# Patient Record
Sex: FEMALE | URBAN - METROPOLITAN AREA
[De-identification: names, ages, dates, MRNs, and addresses within clinical notes are randomized per-mention and may not be internally consistent; named-entity substitution may affect disease eponyms.]

---

## 2024-07-12 ENCOUNTER — HOSPITAL ENCOUNTER (INPATIENT)
Age: 79
LOS: 5 days | Discharge: NURSING, REHAB OR LONG-TERM CARE | End: 2024-07-17
Attending: INTERNAL MEDICINE
Payer: MEDICARE

## 2024-07-12 DIAGNOSIS — N13.1 HYDRONEPHROSIS WITH URETERAL STRICTURE, NOT ELSEWHERE CLASSIFIED: Primary | ICD-10-CM

## 2024-07-12 PROBLEM — A41.9 SEPSIS (HCC): Status: ACTIVE | Noted: 2024-07-12

## 2024-07-12 LAB
ALBUMIN SERPL-MCNC: 2.9 G/DL (ref 3.5–5.2)
ALBUMIN/GLOB SERPL: 1 {RATIO} (ref 1–2.5)
ALP SERPL-CCNC: 107 U/L (ref 35–104)
ALT SERPL-CCNC: <5 U/L (ref 10–35)
ANION GAP SERPL CALCULATED.3IONS-SCNC: 12 MMOL/L (ref 9–16)
AST SERPL-CCNC: 14 U/L (ref 10–35)
BASOPHILS # BLD: 0 K/UL (ref 0–0.2)
BASOPHILS NFR BLD: 0 % (ref 0–2)
BILIRUB SERPL-MCNC: <0.2 MG/DL (ref 0–1.2)
BUN SERPL-MCNC: 28 MG/DL (ref 8–23)
CALCIUM SERPL-MCNC: 8.2 MG/DL (ref 8.6–10.4)
CHLORIDE SERPL-SCNC: 113 MMOL/L (ref 98–107)
CO2 SERPL-SCNC: 10 MMOL/L (ref 20–31)
CREAT SERPL-MCNC: 1.6 MG/DL (ref 0.5–0.9)
EOSINOPHIL # BLD: 0 K/UL (ref 0–0.4)
EOSINOPHILS RELATIVE PERCENT: 0 % (ref 1–4)
ERYTHROCYTE [DISTWIDTH] IN BLOOD BY AUTOMATED COUNT: 15.3 % (ref 11.8–14.4)
GFR, ESTIMATED: 32 ML/MIN/1.73M2
GLUCOSE BLD-MCNC: 122 MG/DL (ref 65–105)
GLUCOSE SERPL-MCNC: 166 MG/DL (ref 74–99)
HCT VFR BLD AUTO: 36.9 % (ref 36.3–47.1)
HGB BLD-MCNC: 11 G/DL (ref 11.9–15.1)
IMM GRANULOCYTES # BLD AUTO: 0.29 K/UL (ref 0–0.3)
IMM GRANULOCYTES NFR BLD: 2 %
LYMPHOCYTES NFR BLD: 0.72 K/UL (ref 1–4.8)
LYMPHOCYTES RELATIVE PERCENT: 5 % (ref 24–44)
MCH RBC QN AUTO: 29.2 PG (ref 25.2–33.5)
MCHC RBC AUTO-ENTMCNC: 29.8 G/DL (ref 28.4–34.8)
MCV RBC AUTO: 97.9 FL (ref 82.6–102.9)
MONOCYTES NFR BLD: 0.29 K/UL (ref 0.1–0.8)
MONOCYTES NFR BLD: 2 % (ref 1–7)
MORPHOLOGY: ABNORMAL
NEUTROPHILS NFR BLD: 91 % (ref 36–66)
NEUTS SEG NFR BLD: 13.1 K/UL (ref 1.8–7.7)
NRBC BLD-RTO: 0 PER 100 WBC
PLATELET # BLD AUTO: 349 K/UL (ref 138–453)
PMV BLD AUTO: 8.8 FL (ref 8.1–13.5)
POTASSIUM SERPL-SCNC: 4.4 MMOL/L (ref 3.7–5.3)
PROT SERPL-MCNC: 6.2 G/DL (ref 6.6–8.7)
RBC # BLD AUTO: 3.77 M/UL (ref 3.95–5.11)
SODIUM SERPL-SCNC: 135 MMOL/L (ref 136–145)
WBC OTHER # BLD: 14.4 K/UL (ref 3.5–11.3)

## 2024-07-12 PROCEDURE — 80053 COMPREHEN METABOLIC PANEL: CPT

## 2024-07-12 PROCEDURE — 6360000002 HC RX W HCPCS

## 2024-07-12 PROCEDURE — 2000000000 HC ICU R&B

## 2024-07-12 PROCEDURE — 6370000000 HC RX 637 (ALT 250 FOR IP)

## 2024-07-12 PROCEDURE — 2500000003 HC RX 250 WO HCPCS

## 2024-07-12 PROCEDURE — 2580000003 HC RX 258

## 2024-07-12 PROCEDURE — 36415 COLL VENOUS BLD VENIPUNCTURE: CPT

## 2024-07-12 PROCEDURE — 85025 COMPLETE CBC W/AUTO DIFF WBC: CPT

## 2024-07-12 PROCEDURE — 87040 BLOOD CULTURE FOR BACTERIA: CPT

## 2024-07-12 PROCEDURE — 82947 ASSAY GLUCOSE BLOOD QUANT: CPT

## 2024-07-12 PROCEDURE — 87086 URINE CULTURE/COLONY COUNT: CPT

## 2024-07-12 PROCEDURE — 99223 1ST HOSP IP/OBS HIGH 75: CPT | Performed by: INTERNAL MEDICINE

## 2024-07-12 PROCEDURE — 87641 MR-STAPH DNA AMP PROBE: CPT

## 2024-07-12 RX ORDER — ONDANSETRON 4 MG/1
4 TABLET, ORALLY DISINTEGRATING ORAL EVERY 8 HOURS PRN
Status: DISCONTINUED | OUTPATIENT
Start: 2024-07-12 | End: 2024-07-17 | Stop reason: HOSPADM

## 2024-07-12 RX ORDER — PHENAZOPYRIDINE HYDROCHLORIDE 100 MG/1
200 TABLET, FILM COATED ORAL 3 TIMES DAILY PRN
Status: DISCONTINUED | OUTPATIENT
Start: 2024-07-12 | End: 2024-07-14

## 2024-07-12 RX ORDER — MAGNESIUM SULFATE IN WATER 40 MG/ML
2000 INJECTION, SOLUTION INTRAVENOUS PRN
Status: DISCONTINUED | OUTPATIENT
Start: 2024-07-12 | End: 2024-07-17 | Stop reason: HOSPADM

## 2024-07-12 RX ORDER — NOREPINEPHRINE BITARTRATE 0.06 MG/ML
1-100 INJECTION, SOLUTION INTRAVENOUS CONTINUOUS
Status: DISCONTINUED | OUTPATIENT
Start: 2024-07-12 | End: 2024-07-15

## 2024-07-12 RX ORDER — CARVEDILOL 25 MG/1
25 TABLET ORAL 2 TIMES DAILY WITH MEALS
Status: DISCONTINUED | OUTPATIENT
Start: 2024-07-12 | End: 2024-07-17 | Stop reason: HOSPADM

## 2024-07-12 RX ORDER — ISOSORBIDE MONONITRATE 30 MG/1
30 TABLET, EXTENDED RELEASE ORAL DAILY
Status: DISCONTINUED | OUTPATIENT
Start: 2024-07-12 | End: 2024-07-17 | Stop reason: HOSPADM

## 2024-07-12 RX ORDER — SODIUM CHLORIDE 9 MG/ML
INJECTION, SOLUTION INTRAVENOUS PRN
Status: DISCONTINUED | OUTPATIENT
Start: 2024-07-12 | End: 2024-07-17 | Stop reason: HOSPADM

## 2024-07-12 RX ORDER — ATORVASTATIN CALCIUM 40 MG/1
40 TABLET, FILM COATED ORAL NIGHTLY
Status: DISCONTINUED | OUTPATIENT
Start: 2024-07-12 | End: 2024-07-17 | Stop reason: HOSPADM

## 2024-07-12 RX ORDER — SODIUM CHLORIDE 0.9 % (FLUSH) 0.9 %
5-40 SYRINGE (ML) INJECTION EVERY 12 HOURS SCHEDULED
Status: DISCONTINUED | OUTPATIENT
Start: 2024-07-12 | End: 2024-07-17 | Stop reason: HOSPADM

## 2024-07-12 RX ORDER — PHENAZOPYRIDINE HYDROCHLORIDE 100 MG/1
200 TABLET, FILM COATED ORAL
Status: DISCONTINUED | OUTPATIENT
Start: 2024-07-13 | End: 2024-07-12

## 2024-07-12 RX ORDER — ACETAMINOPHEN 650 MG/1
650 SUPPOSITORY RECTAL EVERY 6 HOURS PRN
Status: DISCONTINUED | OUTPATIENT
Start: 2024-07-12 | End: 2024-07-17 | Stop reason: HOSPADM

## 2024-07-12 RX ORDER — SODIUM CHLORIDE 0.9 % (FLUSH) 0.9 %
5-40 SYRINGE (ML) INJECTION PRN
Status: DISCONTINUED | OUTPATIENT
Start: 2024-07-12 | End: 2024-07-17 | Stop reason: HOSPADM

## 2024-07-12 RX ORDER — MIDODRINE HYDROCHLORIDE 5 MG/1
2.5 TABLET ORAL
Status: DISCONTINUED | OUTPATIENT
Start: 2024-07-12 | End: 2024-07-13

## 2024-07-12 RX ORDER — DULOXETIN HYDROCHLORIDE 20 MG/1
20 CAPSULE, DELAYED RELEASE ORAL NIGHTLY
Status: DISCONTINUED | OUTPATIENT
Start: 2024-07-12 | End: 2024-07-17 | Stop reason: HOSPADM

## 2024-07-12 RX ORDER — POTASSIUM CHLORIDE 29.8 MG/ML
20 INJECTION INTRAVENOUS PRN
Status: DISCONTINUED | OUTPATIENT
Start: 2024-07-12 | End: 2024-07-17 | Stop reason: HOSPADM

## 2024-07-12 RX ORDER — ACETAMINOPHEN 325 MG/1
650 TABLET ORAL EVERY 6 HOURS PRN
Status: DISCONTINUED | OUTPATIENT
Start: 2024-07-12 | End: 2024-07-17 | Stop reason: HOSPADM

## 2024-07-12 RX ORDER — IPRATROPIUM BROMIDE AND ALBUTEROL SULFATE 2.5; .5 MG/3ML; MG/3ML
1 SOLUTION RESPIRATORY (INHALATION) EVERY 4 HOURS PRN
Status: DISCONTINUED | OUTPATIENT
Start: 2024-07-12 | End: 2024-07-17 | Stop reason: HOSPADM

## 2024-07-12 RX ORDER — DEXMEDETOMIDINE HYDROCHLORIDE 4 UG/ML
.1-1.5 INJECTION, SOLUTION INTRAVENOUS CONTINUOUS
Status: DISCONTINUED | OUTPATIENT
Start: 2024-07-12 | End: 2024-07-15

## 2024-07-12 RX ORDER — POTASSIUM CHLORIDE 7.45 MG/ML
10 INJECTION INTRAVENOUS PRN
Status: DISCONTINUED | OUTPATIENT
Start: 2024-07-12 | End: 2024-07-17 | Stop reason: HOSPADM

## 2024-07-12 RX ORDER — POLYETHYLENE GLYCOL 3350 17 G/17G
17 POWDER, FOR SOLUTION ORAL DAILY PRN
Status: DISCONTINUED | OUTPATIENT
Start: 2024-07-12 | End: 2024-07-17 | Stop reason: HOSPADM

## 2024-07-12 RX ORDER — ONDANSETRON 2 MG/ML
4 INJECTION INTRAMUSCULAR; INTRAVENOUS EVERY 6 HOURS PRN
Status: DISCONTINUED | OUTPATIENT
Start: 2024-07-12 | End: 2024-07-17 | Stop reason: HOSPADM

## 2024-07-12 RX ORDER — OXYCODONE HYDROCHLORIDE 5 MG/1
5 TABLET ORAL ONCE
Status: COMPLETED | OUTPATIENT
Start: 2024-07-13 | End: 2024-07-12

## 2024-07-12 RX ORDER — HEPARIN SODIUM 5000 [USP'U]/ML
5000 INJECTION, SOLUTION INTRAVENOUS; SUBCUTANEOUS EVERY 8 HOURS SCHEDULED
Status: DISCONTINUED | OUTPATIENT
Start: 2024-07-12 | End: 2024-07-17 | Stop reason: HOSPADM

## 2024-07-12 RX ADMIN — DULOXETINE HYDROCHLORIDE 20 MG: 20 CAPSULE, DELAYED RELEASE ORAL at 20:26

## 2024-07-12 RX ADMIN — PANTOPRAZOLE SODIUM 40 MG: 40 INJECTION, POWDER, FOR SOLUTION INTRAVENOUS at 17:52

## 2024-07-12 RX ADMIN — HYOSCYAMINE SULFATE 125 MCG: 0.12 TABLET SUBLINGUAL at 23:51

## 2024-07-12 RX ADMIN — SODIUM CHLORIDE, PRESERVATIVE FREE 10 ML: 5 INJECTION INTRAVENOUS at 19:34

## 2024-07-12 RX ADMIN — HEPARIN SODIUM 5000 UNITS: 5000 INJECTION INTRAVENOUS; SUBCUTANEOUS at 21:15

## 2024-07-12 RX ADMIN — MIDODRINE HYDROCHLORIDE 2.5 MG: 5 TABLET ORAL at 17:53

## 2024-07-12 RX ADMIN — ACETAMINOPHEN 650 MG: 325 TABLET ORAL at 20:30

## 2024-07-12 RX ADMIN — OXYCODONE 5 MG: 5 TABLET ORAL at 23:48

## 2024-07-12 RX ADMIN — SODIUM CHLORIDE: 9 INJECTION, SOLUTION INTRAVENOUS at 17:56

## 2024-07-12 RX ADMIN — DESMOPRESSIN ACETATE 40 MG: 0.2 TABLET ORAL at 20:25

## 2024-07-12 RX ADMIN — Medication 8 MCG/MIN: at 16:14

## 2024-07-12 RX ADMIN — MEROPENEM 1000 MG: 1 INJECTION INTRAVENOUS at 20:25

## 2024-07-12 RX ADMIN — Medication 1000 MG: at 17:52

## 2024-07-12 RX ADMIN — DEXMEDETOMIDINE HYDROCHLORIDE 0.2 MCG/KG/HR: 4 INJECTION, SOLUTION INTRAVENOUS at 19:32

## 2024-07-12 ASSESSMENT — PAIN DESCRIPTION - LOCATION
LOCATION: VULVA
LOCATION: VULVA
LOCATION: FOOT
LOCATION: VULVA

## 2024-07-12 ASSESSMENT — PAIN DESCRIPTION - DESCRIPTORS
DESCRIPTORS: BURNING;CRAMPING
DESCRIPTORS: BURNING
DESCRIPTORS: BURNING

## 2024-07-12 ASSESSMENT — PAIN SCALES - WONG BAKER
WONGBAKER_NUMERICALRESPONSE: NO HURT

## 2024-07-12 ASSESSMENT — PAIN SCALES - GENERAL
PAINLEVEL_OUTOF10: 10
PAINLEVEL_OUTOF10: 0
PAINLEVEL_OUTOF10: 10

## 2024-07-12 NOTE — H&P
Critical Care - History and Physical Examination    Patient's name:  Suzy Flaherty  Medical Record Number: [id number]  Patient's account/billing number: [id number]  Patient's YOB: 1945  Age: 78 y.o.  Date of Admission: 7/12/2024  4:04 PM  Reason of ICU admission:   Date of History and Physical Examination: 7/12/2024      Primary Care Physician: No primary care provider on file.  Attending Physician:    Code Status: Full Code     Chief complaint: AMS     Reason for ICU admission:  Septic Shock       History Of Present Illness:   History was obtained from the patient.      Suzy Flaherty is a 78 y.o. with Pmhx of   Prior Urethral Stricture S/P Stent Exchange 7/11/2024, CKD Stage IV, prensented to the ED for Hyperkalemia of 6.5 with EKG Changes , new onset Hydronephrosis, Hypotension. Patient in ED had UA showing evidence of Elevated Leucocyte Esterase. Patient initially BP was controlled with 2L LR Bolus but than became Hypotensive with recorded Systolic BP of  70's. Started and Progressed to Levophed at 8 mcg/min. Patient at her baseline mentation is A&O x 2 but today was somnolent and appeared confused but not agitated . Patient was transferred to ICU due to AMS and Hypotensive episodes requiring Vasopressors.           Past Medical History:  No past medical history on file.    Past Surgical History:  No past surgical history on file.    Allergies:    Allergies   Allergen Reactions    Fentanyl Hallucinations     Pt sensitive to medication - hallucinates         Home Medications:   Prior to Admission medications    Not on File       Social History:   TOBACCO:   has no history on file for tobacco use.  ETOH:   has no history on file for alcohol use.  DRUGS:  has no history on file for drug use.  OCCUPATION:      Family History:   No family history on file.        REVIEW OF SYSTEMS (ROS):  Review of Systems - Negative except mentioned in HPI   General ROS: negative  Psychological ROS:  negative  Ophthalmic ROS: negative  ENT: negative  Hematological and Lymphatic ROS: negative  Endocrine ROS: negative  Breast ROS: negative  Respiratory ROS: no cough, shortness of breath, or wheezing  Cardiovascular ROS: no chest pain or dyspnea on exertion  Gastrointestinal ROS:negative  Genito-Urinary ROS: negative  Musculoskeletal ROS: negative  Neurological ROS: negative  Dermatological ROS: negative      Physical Exam:    Vitals: Pulse 97   Temp 99 °F (37.2 °C) (Axillary)   Resp 18     Body weight:   Wt Readings from Last 3 Encounters:   No data found for Wt       Body Mass Index : There is no height or weight on file to calculate BMI.          PHYSICAL EXAMINATION :  Constitutional: Patient appears anxious, A&O x 1   EENT: PERRLA, EOMI, sclera clear, anicteric, oropharynx clear, no lesions, neck supple with midline trachea. Dry Mucus Membrane   Neck: Supple, symmetrical, trachea midline, no adenopathy, thyroid symmetric, no jvd skin normal  Respiratory: clear to auscultation, no wheezes or rales and unlabored breathing. No intercostal tenderness  Cardiovascular: regular rate and rhythm, normal S1, S2, no murmur noted and 2+ pulses throughout  Abdomen: soft, nontender, nondistended, no masses or organomegaly  Neurological:  Extremities:  peripheral pulses normal, no pedal edema, no clubbing or cyanosis      Laboratory findings:-    CBC: No results for input(s): \"WBC\", \"HGB\", \"PLT\" in the last 72 hours.  BMP:  No results for input(s): \"NA\", \"K\", \"CL\", \"CO2\", \"BUN\", \"CREATININE\", \"GLUCOSE\" in the last 72 hours.  S. Calcium:No results for input(s): \"CALCIUM\" in the last 72 hours.  S. Ionized Calcium:Invalid input(s): \"IONCA\"  S. Magnesium:No results for input(s): \"MG\" in the last 72 hours.  S. Phosphorus:No results for input(s): \"PHOS\" in the last 72 hours.  S. Glucose:No results for input(s): \"POCGLU\" in the last 72 hours.  Glycosylated hemoglobin A1C: No results for input(s): \"LABA1C\" in the last 72

## 2024-07-12 NOTE — PLAN OF CARE
Problem: Discharge Planning  Goal: Discharge to home or other facility with appropriate resources  Outcome: Progressing     Problem: Pain  Goal: Verbalizes/displays adequate comfort level or baseline comfort level  Outcome: Progressing     Problem: Safety - Adult  Goal: Free from fall injury  Outcome: Progressing     Problem: Skin/Tissue Integrity  Goal: Absence of new skin breakdown  Description: 1.  Monitor for areas of redness and/or skin breakdown  2.  Assess vascular access sites hourly  3.  Every 4-6 hours minimum:  Change oxygen saturation probe site  4.  Every 4-6 hours:  If on nasal continuous positive airway pressure, respiratory therapy assess nares and determine need for appliance change or resting period.  Outcome: Progressing     Problem: ABCDS Injury Assessment  Goal: Absence of physical injury  Outcome: Progressing     Problem: Confusion  Goal: Confusion, delirium, dementia, or psychosis is improved or at baseline  Description: INTERVENTIONS:  1. Assess for possible contributors to thought disturbance, including medications, impaired vision or hearing, underlying metabolic abnormalities, dehydration, psychiatric diagnoses, and notify attending LIP  2. [place] high risk fall precautions, as indicated  3. Provide frequent short contacts to provide reality reorientation, refocusing and direction  4. Decrease environmental stimuli, including noise as appropriate  5. Monitor and intervene to maintain adequate nutrition, hydration, elimination, sleep and activity  6. If unable to ensure safety without constant attention obtain sitter and review sitter guidelines with assigned personnel  7. Initiate Psychosocial CNS and Spiritual Care consult, as indicated  Outcome: Progressing     Problem: Respiratory - Adult  Goal: Achieves optimal ventilation and oxygenation  Outcome: Progressing     Problem: Cardiovascular - Adult  Goal: Maintains optimal cardiac output and hemodynamic stability  Outcome:  Progressing  Goal: Absence of cardiac dysrhythmias or at baseline  Outcome: Progressing     Problem: Skin/Tissue Integrity - Adult  Goal: Skin integrity remains intact  Outcome: Progressing  Goal: Incisions, wounds, or drain sites healing without S/S of infection  Outcome: Progressing  Goal: Oral mucous membranes remain intact  Outcome: Progressing

## 2024-07-13 PROBLEM — N39.0 URINARY TRACT INFECTION WITHOUT HEMATURIA: Status: ACTIVE | Noted: 2024-07-13

## 2024-07-13 LAB
ANION GAP SERPL CALCULATED.3IONS-SCNC: 12 MMOL/L (ref 9–16)
BASOPHILS # BLD: 0.04 K/UL (ref 0–0.2)
BASOPHILS NFR BLD: 0 % (ref 0–2)
BUN SERPL-MCNC: 27 MG/DL (ref 8–23)
CALCIUM SERPL-MCNC: 8.5 MG/DL (ref 8.6–10.4)
CHLORIDE SERPL-SCNC: 115 MMOL/L (ref 98–107)
CO2 SERPL-SCNC: 11 MMOL/L (ref 20–31)
CREAT SERPL-MCNC: 1.6 MG/DL (ref 0.5–0.9)
EOSINOPHIL # BLD: <0.03 K/UL (ref 0–0.44)
EOSINOPHILS RELATIVE PERCENT: 0 % (ref 1–4)
ERYTHROCYTE [DISTWIDTH] IN BLOOD BY AUTOMATED COUNT: 15.2 % (ref 11.8–14.4)
GFR, ESTIMATED: 32 ML/MIN/1.73M2
GLUCOSE SERPL-MCNC: 130 MG/DL (ref 74–99)
HCT VFR BLD AUTO: 35.4 % (ref 36.3–47.1)
HGB BLD-MCNC: 10.7 G/DL (ref 11.9–15.1)
IMM GRANULOCYTES # BLD AUTO: 0.2 K/UL (ref 0–0.3)
IMM GRANULOCYTES NFR BLD: 2 %
LYMPHOCYTES NFR BLD: 0.83 K/UL (ref 1.1–3.7)
LYMPHOCYTES RELATIVE PERCENT: 6 % (ref 24–43)
MCH RBC QN AUTO: 28.8 PG (ref 25.2–33.5)
MCHC RBC AUTO-ENTMCNC: 30.2 G/DL (ref 28.4–34.8)
MCV RBC AUTO: 95.4 FL (ref 82.6–102.9)
MICROORGANISM SPEC CULT: NORMAL
MONOCYTES NFR BLD: 0.76 K/UL (ref 0.1–1.2)
MONOCYTES NFR BLD: 6 % (ref 3–12)
MRSA, DNA, NASAL: ABNORMAL
NEUTROPHILS NFR BLD: 86 % (ref 36–65)
NEUTS SEG NFR BLD: 11.85 K/UL (ref 1.5–8.1)
NRBC BLD-RTO: 0 PER 100 WBC
PLATELET # BLD AUTO: 300 K/UL (ref 138–453)
PMV BLD AUTO: 8.7 FL (ref 8.1–13.5)
POTASSIUM SERPL-SCNC: 3.9 MMOL/L (ref 3.7–5.3)
RBC # BLD AUTO: 3.71 M/UL (ref 3.95–5.11)
RBC # BLD: ABNORMAL 10*6/UL
SERVICE CMNT-IMP: NORMAL
SODIUM SERPL-SCNC: 138 MMOL/L (ref 136–145)
SPECIMEN DESCRIPTION: ABNORMAL
SPECIMEN DESCRIPTION: NORMAL
WBC OTHER # BLD: 13.7 K/UL (ref 3.5–11.3)

## 2024-07-13 PROCEDURE — 6370000000 HC RX 637 (ALT 250 FOR IP)

## 2024-07-13 PROCEDURE — 6360000002 HC RX W HCPCS

## 2024-07-13 PROCEDURE — 85025 COMPLETE CBC W/AUTO DIFF WBC: CPT

## 2024-07-13 PROCEDURE — 99291 CRITICAL CARE FIRST HOUR: CPT | Performed by: INTERNAL MEDICINE

## 2024-07-13 PROCEDURE — 2000000000 HC ICU R&B

## 2024-07-13 PROCEDURE — 36415 COLL VENOUS BLD VENIPUNCTURE: CPT

## 2024-07-13 PROCEDURE — 99233 SBSQ HOSP IP/OBS HIGH 50: CPT | Performed by: INTERNAL MEDICINE

## 2024-07-13 PROCEDURE — 2580000003 HC RX 258

## 2024-07-13 PROCEDURE — 80048 BASIC METABOLIC PNL TOTAL CA: CPT

## 2024-07-13 PROCEDURE — 2500000003 HC RX 250 WO HCPCS

## 2024-07-13 RX ORDER — MIDODRINE HYDROCHLORIDE 5 MG/1
5 TABLET ORAL
Status: DISCONTINUED | OUTPATIENT
Start: 2024-07-13 | End: 2024-07-17 | Stop reason: HOSPADM

## 2024-07-13 RX ORDER — SODIUM CHLORIDE, SODIUM LACTATE, POTASSIUM CHLORIDE, CALCIUM CHLORIDE 600; 310; 30; 20 MG/100ML; MG/100ML; MG/100ML; MG/100ML
INJECTION, SOLUTION INTRAVENOUS CONTINUOUS
Status: DISCONTINUED | OUTPATIENT
Start: 2024-07-13 | End: 2024-07-17 | Stop reason: HOSPADM

## 2024-07-13 RX ORDER — MORPHINE SULFATE 4 MG/ML
4 INJECTION, SOLUTION INTRAMUSCULAR; INTRAVENOUS ONCE
Status: COMPLETED | OUTPATIENT
Start: 2024-07-13 | End: 2024-07-13

## 2024-07-13 RX ADMIN — DESMOPRESSIN ACETATE 40 MG: 0.2 TABLET ORAL at 19:56

## 2024-07-13 RX ADMIN — DEXMEDETOMIDINE HYDROCHLORIDE 0.4 MCG/KG/HR: 4 INJECTION, SOLUTION INTRAVENOUS at 11:18

## 2024-07-13 RX ADMIN — SODIUM CHLORIDE, PRESERVATIVE FREE 10 ML: 5 INJECTION INTRAVENOUS at 20:01

## 2024-07-13 RX ADMIN — HEPARIN SODIUM 5000 UNITS: 5000 INJECTION INTRAVENOUS; SUBCUTANEOUS at 14:30

## 2024-07-13 RX ADMIN — ACETAMINOPHEN 650 MG: 325 TABLET ORAL at 20:37

## 2024-07-13 RX ADMIN — SODIUM CHLORIDE, PRESERVATIVE FREE 10 ML: 5 INJECTION INTRAVENOUS at 10:32

## 2024-07-13 RX ADMIN — PANTOPRAZOLE SODIUM 40 MG: 40 INJECTION, POWDER, FOR SOLUTION INTRAVENOUS at 10:17

## 2024-07-13 RX ADMIN — PHENAZOPYRIDINE HYDROCHLORIDE 200 MG: 100 TABLET ORAL at 03:50

## 2024-07-13 RX ADMIN — MEROPENEM 1000 MG: 1 INJECTION INTRAVENOUS at 10:20

## 2024-07-13 RX ADMIN — HEPARIN SODIUM 5000 UNITS: 5000 INJECTION INTRAVENOUS; SUBCUTANEOUS at 05:09

## 2024-07-13 RX ADMIN — SODIUM CHLORIDE, POTASSIUM CHLORIDE, SODIUM LACTATE AND CALCIUM CHLORIDE: 600; 310; 30; 20 INJECTION, SOLUTION INTRAVENOUS at 15:06

## 2024-07-13 RX ADMIN — PHENAZOPYRIDINE HYDROCHLORIDE 200 MG: 100 TABLET ORAL at 19:56

## 2024-07-13 RX ADMIN — MIDODRINE HYDROCHLORIDE 5 MG: 5 TABLET ORAL at 17:17

## 2024-07-13 RX ADMIN — MEROPENEM 1000 MG: 1 INJECTION INTRAVENOUS at 20:00

## 2024-07-13 RX ADMIN — HYOSCYAMINE SULFATE 125 MCG: 0.12 TABLET SUBLINGUAL at 05:44

## 2024-07-13 RX ADMIN — DULOXETINE HYDROCHLORIDE 20 MG: 20 CAPSULE, DELAYED RELEASE ORAL at 19:56

## 2024-07-13 RX ADMIN — HYOSCYAMINE SULFATE 125 MCG: 0.12 TABLET SUBLINGUAL at 20:38

## 2024-07-13 RX ADMIN — MIDODRINE HYDROCHLORIDE 2.5 MG: 5 TABLET ORAL at 10:17

## 2024-07-13 RX ADMIN — HEPARIN SODIUM 5000 UNITS: 5000 INJECTION INTRAVENOUS; SUBCUTANEOUS at 21:00

## 2024-07-13 RX ADMIN — MORPHINE SULFATE 4 MG: 4 INJECTION INTRAVENOUS at 05:56

## 2024-07-13 ASSESSMENT — PAIN SCALES - WONG BAKER

## 2024-07-13 ASSESSMENT — PAIN DESCRIPTION - LOCATION
LOCATION: VULVA

## 2024-07-13 ASSESSMENT — PAIN SCALES - GENERAL
PAINLEVEL_OUTOF10: 10
PAINLEVEL_OUTOF10: 3
PAINLEVEL_OUTOF10: 0
PAINLEVEL_OUTOF10: 10
PAINLEVEL_OUTOF10: 0
PAINLEVEL_OUTOF10: 10
PAINLEVEL_OUTOF10: 0

## 2024-07-13 ASSESSMENT — PAIN DESCRIPTION - DESCRIPTORS
DESCRIPTORS: BURNING
DESCRIPTORS: BURNING
DESCRIPTORS: BURNING;CRAMPING
DESCRIPTORS: BURNING;CRAMPING

## 2024-07-13 NOTE — RT PROTOCOL NOTE
RT Nebulizer Bronchodilator Protocol Note    There is a bronchodilator order in the chart from a provider indicating to follow the RT Bronchodilator Protocol and there is an “Initiate RT Bronchodilator Protocol” order as well (see protocol at bottom of note).    CXR Findings:  No results found.    The findings from the last RT Protocol Assessment were as follows:  Smoking: None or smoker <15 pack years  Respiratory Pattern: Regular pattern and RR 12-20 bpm  Breath Sounds: Clear breath sounds  Cough: Strong, spontaneous, non-productive  Indication for Bronchodilator Therapy:    Bronchodilator Assessment Score: 0    Aerosolized bronchodilator medication orders have been revised according to the RT Nebulizer Bronchodilator Protocol below.    Respiratory Therapist to perform RT Therapy Protocol Assessment initially then follow the protocol.  Repeat RT Therapy Protocol Assessment PRN for score 0-3 or on second treatment, BID, and PRN for scores above 3.    No Indications - adjust the frequency to every 6 hours PRN wheezing or bronchospasm, if no treatments needed after 48 hours then discontinue using Per Protocol order mode.     If indication present, adjust the RT bronchodilator orders based on the Bronchodilator Assessment Score as indicated below.  If a patient is on this medication at home then do not decrease Frequency below that used at home.    0-3 - enter or revise RT bronchodilator order(s) to equivalent RT Bronchodilator order with Frequency of every 4 hours PRN for wheezing or increased work of breathing using Per Protocol order mode.       4-6 - enter or revise RT Bronchodilator order(s) to two equivalent RT bronchodilator orders with one order with BID Frequency and one order with Frequency of every 4 hours PRN wheezing or increased work of breathing using Per Protocol order mode.         7-10 - enter or revise RT Bronchodilator order(s) to two equivalent RT bronchodilator orders with one order with TID  Frequency and one order with Frequency of every 4 hours PRN wheezing or increased work of breathing using Per Protocol order mode.       11-13 - enter or revise RT Bronchodilator order(s) to one equivalent RT bronchodilator order with QID Frequency and an Albuterol order with Frequency of every 4 hours PRN wheezing or increased work of breathing using Per Protocol order mode.      Greater than 13 - enter or revise RT Bronchodilator order(s) to one equivalent RT bronchodilator order with every 4 hours Frequency and an Albuterol order with Frequency of every 2 hours PRN wheezing or increased work of breathing using Per Protocol order mode.     RT to enter RT Home Evaluation for COPD & MDI Assessment order using Per Protocol order mode.    Electronically signed by CLAY YBARRA RCP on 7/12/2024 at 11:14 PM

## 2024-07-13 NOTE — PLAN OF CARE
Problem: Pain  Goal: Verbalizes/displays adequate comfort level or baseline comfort level  7/13/2024 1803 by Anamika Quevedo RN  Outcome: Progressing     Problem: Safety - Adult  Goal: Free from fall injury  7/13/2024 1803 by Anamika Quevedo RN  Outcome: Progressing     Problem: Skin/Tissue Integrity  Goal: Absence of new skin breakdown  7/13/2024 1803 by Anamika Quevedo RN  Outcome: Progressing     Problem: Respiratory - Adult  Goal: Achieves optimal ventilation and oxygenation  7/13/2024 1803 by Anamika Quevedo RN  Outcome: Progressing     Problem: Cardiovascular - Adult  Goal: Maintains optimal cardiac output and hemodynamic stability  7/13/2024 1803 by Anamika Quevedo RN  Outcome: Progressing     Problem: Skin/Tissue Integrity - Adult  Goal: Skin integrity remains intact  7/13/2024 1803 by Anamika Quevedo RN  Outcome: Progressing     Problem: Skin/Tissue Integrity - Adult  Goal: Incisions, wounds, or drain sites healing without S/S of infection  7/13/2024 1803 by Anamika Quevedo, RN  Outcome: Progressing

## 2024-07-13 NOTE — CONSULTS
advice on treatment.     CURRENT EVALUATION- DAILY INTERVAL CHANGES 7/12/2024  BP (!) 126/54   Pulse 82   Temp 97.8 °F (36.6 °C) (Oral)   Resp 13   Ht 1.62 m (5' 3.78\")   Wt 78.8 kg (173 lb 11.6 oz)   SpO2 96%   BMI 30.03 kg/m²     Patient evaluated and examined in the ICU.     Afebrile  VS stable. BP supported with Levophed @ 8 mcg    Patient on room air  RR  15  02 sat  96    No new issues reported    Medications reviewed:  Meropenem 1 gm q 12 hr    Labs, X rays reviewed: 7/12/2024 with independent review of X rays    BUN: 28  Cr: 1.6  Na 135    WBC: 14.4  Hb: 11  Plat:  349    CRP:     Cultures:  Urine:  6-8-24: Citrobacter amanoliticus, Enterococcus raffinosus    Blood:  7-12-24: pending  Sputum :    Wound:    MRSA Nares:  7-12-24: pending    Imaging:        I have personally reviewed the past medical history, past surgical history, medications, social history, and family history, and I have updated the database accordingly.  Past Medical History:   No past medical history on file.    Past Surgical  History:   No past surgical history on file.    Medications:      sodium chloride flush  5-40 mL IntraVENous 2 times per day    carvedilol  25 mg Oral BID WC    atorvastatin  40 mg Oral Nightly    DULoxetine  20 mg Oral Nightly    isosorbide mononitrate  30 mg Oral Daily    heparin (porcine)  5,000 Units SubCUTAneous 3 times per day    pantoprazole (PROTONIX) 40 mg in sodium chloride (PF) 0.9 % 10 mL injection  40 mg IntraVENous Daily    midodrine  2.5 mg Oral TID WC    meropenem  1,000 mg IntraVENous Q12H       Social History:     Social History     Socioeconomic History    Marital status: Unknown     Spouse name: Not on file    Number of children: Not on file    Years of education: Not on file    Highest education level: Not on file   Occupational History    Not on file   Tobacco Use    Smoking status: Not on file    Smokeless tobacco: Not on file   Substance and Sexual Activity    Alcohol use: Not on file     Drug use: Not on file    Sexual activity: Not on file   Other Topics Concern    Not on file   Social History Narrative    Not on file     Social Determinants of Health     Financial Resource Strain: Not on file   Food Insecurity: Not on file   Transportation Needs: Not on file   Physical Activity: Not on file   Stress: Not on file   Social Connections: Not on file   Intimate Partner Violence: Not on file   Housing Stability: Not on file       Family History:   No family history on file.     Allergies:   Fentanyl     Review of Systems:   Constitutional: No fevers or chills. No systemic complaints  Head: No headaches  Eyes: No double vision or blurry vision. No conjunctival inflammation.  ENT: No sore throat or runny nose.. No hearing loss, tinnitus or vertigo.  Cardiovascular: No chest pain or palpitations.No shortness of breath. No MARRERO  Lung: No shortness of breath or cough. No sputum production  Abdomen: No nausea, vomiting, diarrhea, or abdominal pain.. No cramps.  Genitourinary: No increased urinary frequency, or dysuria. No hematuria. No suprapubic or CVA pain  Musculoskeletal: No muscle aches or pains. No joint effusions, swelling or deformities  Hematologic: No bleeding or bruising.  Neurologic: No headache, weakness, numbness, or tingling. Somnolent, confused  Integument: No rash, no ulcers.  Psychiatric: No depression.   Endocrine: No polyuria, no polydipsia, no polyphagia.    Physical Examination :   Patient Vitals for the past 8 hrs:   BP Temp Temp src Pulse Resp SpO2 Height Weight   07/12/24 2000 (!) 126/54 97.8 °F (36.6 °C) Oral 82 13 96 % -- --   07/12/24 1945 (!) 114/53 -- -- 87 19 94 % -- --   07/12/24 1933 (!) 105/95 -- -- 87 17 97 % -- --   07/12/24 1930 (!) 84/29 -- -- 88 15 95 % -- --   07/12/24 1915 (!) 132/113 -- -- 90 22 97 % -- --   07/12/24 1900 126/75 -- -- 86 15 97 % -- --   07/12/24 1846 -- -- -- -- -- -- 1.62 m (5' 3.78\") 78.8 kg (173 lb 11.6 oz)   07/12/24 4265 (!) 101/57 -- -- 90 15

## 2024-07-13 NOTE — PLAN OF CARE
Problem: Discharge Planning  Goal: Discharge to home or other facility with appropriate resources  7/13/2024 0419 by Desirae Noland RN  Outcome: Progressing  7/12/2024 1951 by Lucrecia Pena RN  Outcome: Progressing     Problem: Pain  Goal: Verbalizes/displays adequate comfort level or baseline comfort level  7/13/2024 0419 by Desirae Noland RN  Outcome: Progressing  7/12/2024 1951 by Lucrecia Pena RN  Outcome: Progressing     Problem: Safety - Adult  Goal: Free from fall injury  7/13/2024 0419 by Desirae Noland RN  Outcome: Progressing  7/12/2024 1951 by Lucrecia Pena RN  Outcome: Progressing     Problem: Skin/Tissue Integrity  Goal: Absence of new skin breakdown  Description: 1.  Monitor for areas of redness and/or skin breakdown  2.  Assess vascular access sites hourly  3.  Every 4-6 hours minimum:  Change oxygen saturation probe site  4.  Every 4-6 hours:  If on nasal continuous positive airway pressure, respiratory therapy assess nares and determine need for appliance change or resting period.  7/13/2024 0419 by Desirae Noland RN  Outcome: Progressing  7/12/2024 1951 by Lucrecia Pena RN  Outcome: Progressing     Problem: ABCDS Injury Assessment  Goal: Absence of physical injury  7/13/2024 0419 by Desirae Noland RN  Outcome: Progressing  7/12/2024 1951 by Lucrecia Pena RN  Outcome: Progressing     Problem: Confusion  Goal: Confusion, delirium, dementia, or psychosis is improved or at baseline  Description: INTERVENTIONS:  1. Assess for possible contributors to thought disturbance, including medications, impaired vision or hearing, underlying metabolic abnormalities, dehydration, psychiatric diagnoses, and notify attending LIP  2. [place] high risk fall precautions, as indicated  3. Provide frequent short contacts to provide reality reorientation, refocusing and direction  4. Decrease environmental stimuli, including noise as appropriate  5.

## 2024-07-13 NOTE — PROGRESS NOTES
INTENSIVE CARE UNIT  Resident Physician Progress Note    Patient - Suzy Flaherty  Date of Admission -  2024  4:04 PM  Date of Evaluation -  2024  Room and Bed Number -  3016/3016-01   Hospital Day - 1      SUBJECTIVE:     OVERNIGHT EVENTS:   No acute events overnight    TODAY:  Patient seems more comfortable, states pain improved but continues to be confused     AWAKE & FOLLOWING COMMANDS:  [] No   [x] Yes    SECRETIONS Amount:  [] Small [] Moderate  [] Large  [x] None  Color:     [] White [] Colored  [] Bloody    SEDATION:  RAAS Score:  [] Propofol gtt  [] Versed gtt  [] Ativan gtt   [] No Sedation    PARALYZED:  [x] No    [] Yes    VASOPRESSORS:  [] No    [x] Yes  [x] Levophed [] Dopamine [] Vasopressin  [] Dobutamine [] Phenylephrine [] Epinephrine    F.A.S.T. M. H.U.G.S. B.I.D.    Feeding Diet: Diet NPO Exceptions are: Sips of Water with Meds  Fluids: LR at 75 ml/hr   Family: Allie Davies ( [phone]) Spoke on admission   Analgesic: Roxicodone   Sedation: Precedex Low Dose, attempt to wean off    Thrombo-prophylaxis: [] Enoxaparin, [x] Unfract. Heparin Subcutaneously, [] EPC Cuffs  Mobility: Bed Rest   Heads up: Yes   Ulcer prophylaxis: [x] PPI Agent, [] [id number], [] Sucralfate, [] Other:  Glycemic control: None   Spontaneous breathing trial: N/A   Bowel regimen/urine output:    Indwelling catheter/lines: Angelo Catheter, Right PICC Line,   De-escalation: No follow up with ID        OBJECTIVE:     VITAL SIGNS:  BP (!) 112/58   Pulse 83   Temp 97.9 °F (36.6 °C) (Oral)   Resp 13   Ht 1.62 m (5' 3.78\")   Wt 78.8 kg (173 lb 11.6 oz)   SpO2 95%   BMI 30.03 kg/m²   Tmax over 24 hours:  Temp (24hrs), Av.5 °F (36.9 °C), Min:97.8 °F (36.6 °C), Max:99.7 °F (37.6 °C)      Patient Vitals for the past 8 hrs:   BP Temp Temp src Pulse Resp SpO2   2445 (!) 112/58 -- -- 83 13 95 %   24 0630 110/87 -- -- 89 23 98 %   24 0626 -- -- -- -- 18 --   2415 (!) 87/62 -- -- 80 16  \"[id number]\", \"O2SAT\", \"FIO2\"      DATA:  Complete Blood Count:   Recent Labs     07/12/24  1809 07/13/24  0311   WBC 14.4* 13.7*   RBC 3.77* 3.71*   HGB 11.0* 10.7*   HCT 36.9 35.4*   MCV 97.9 95.4   MCH 29.2 28.8   MCHC 29.8 30.2   RDW 15.3* 15.2*    300   MPV 8.8 8.7        Last 3 Blood Glucose:   Recent Labs     07/12/24  1809 07/13/24 0311   GLUCOSE 166* 130*        PT/INR:  No results found for: \"PROTIME\", \"INR\"  PTT:  No results found for: \"APTT\"    Comprehensive Metabolic Profile:   Recent Labs     07/12/24 1809 07/13/24 0311   * 138   K 4.4 3.9   * 115*   CO2 10* 11*   BUN 28* 27*   CREATININE 1.6* 1.6*   GLUCOSE 166* 130*   CALCIUM 8.2* 8.5*   BILITOT <0.2  --    ALKPHOS 107*  --    AST 14  --    ALT <5*  --       Magnesium: No results found for: \"MG\"  Phosphorus: No results found for: \"PHOS\"  Ionized Calcium: No results found for: \"CAION\"     Urinalysis: No results found for: \"NITRU\", \"COLORU\", \"PHUR\", \"LABCAST\", \"WBCUA\", \"RBCUA\", \"MUCUS\", \"TRICHOMONAS\", \"YEAST\", \"BACTERIA\", \"CLARITYU\", \"SPECGRAV\", \"LEUKOCYTESUR\", \"UROBILINOGEN\", \"BILIRUBINUR\", \"BLOODU\", \"GLUCOSEU\", \"KETUA\", \"AMORPHOUS\"    HgBA1c:  No results found for: \"LABA1C\"  TSH:  No results found for: \"TSH\"  Lactic Acid: No results found for: \"LACTA\"   Troponin: No results for input(s): \"TROPONINI\" in the last 72 hours.        Radiology/Imaging:      ASSESSMENT:     Patient Active Problem List    Diagnosis Date Noted    Sepsis (HCC) 07/12/2024          PLAN:     WEAN PER PROTOCOL:  [] No   [] Yes  [x] N/A    ICU PROPHYLAXIS:  Stress ulcer:  [x] PPI Agent  [] [id number] [] Sucralfate  [] Other:  VTE:   [] Enoxaparin  [x] Unfract. Heparin Subcut  [] EPC Cuffs    NUTRITION:  [x] NPO [] Tube Feeding (Specify: ) [] TPN  [] PO    HOME MEDS RECONCILED: [] No  [x] Yes    CONSULTATION NEEDED:  [] No  [x] Yes    FAMILY UPDATED:    [] No  [x] Yes    TRANSFER OUT OF ICU:   [x] No  [] Yes          Plan:  Patient appears more comfortable , will

## 2024-07-13 NOTE — PROGRESS NOTES
[facility]  Progress Note    Suzy Flaherty  MRN: [id number]  Date: 7/13/2024  LOS: 1     Reason for F/U :   Septic shock    Impression :   Urethral stricture  Hydronephrosis  Prior hydronephrosis and stent placements in 2021  Stent exchanges every 4 months.   Last exchange 4-26-24 after UTI with Klebsiella   Urinary tract infections  Last UTI with Enterococcus on 5-24-24  Septic shock   CKD stage IV  Hyperkalemia  Altered mentation  Hyponatremia    Recommendations:   The patient continues on broad-spectrum antimicrobial therapy with meropenem  The patient has had multiple urological issues and has cloudy urine at this time.  The patient has also had multiple different organisms isolated in the urine and is pending culture data  The care was discussed with the family at bedside    Infection Control Recommendations:   Universal precaution    Discharge Planning:   Estimated Length of IV antimicrobials: To be determined  Patient will need Midline Catheter Insertion/ PICC line Insertion: No  Patient will need: Home IV , Infusion Center,  SNF,  LTAC: Undetermined  Patient willneed outpatient wound care: No    Medical Decision making / Summary of Stay:   Suzy Flaherty is a 78 y.o.-year-old female who was initially admitted on 7/12/2024. Patient seen at the request of Dr. Chacon     INITIAL HISTORY:     Patient with a prior Hx of distal urethral stricture and hydronephrosis.     Usually followed at [facility]. Had prior hydronephrosis and stent placements in 2021. Since then stent exchanges every 4 months.   Prior exchange 4-26-24 after she had UTI with Klebsiella which responded to treatment.     Hx of Urinary tract infections associated to stents.  Last UTI with Enterococcus on 5-24-24.     Had ureteral stent exchanged on 7-11-24.     Patient presented through ER with complaints of altered mentation. Found to be in Septic shock with associated CKD stage IV and hyperkalemia (K 6.5).     Patient  Requests Site: Blood    Culture NO GROWTH 12 HOURS   Culture, Urine [[id number]] Collected: 07/12/24 2153   Order Status: Sent Specimen: Urine, clean catch Updated: 07/12/24 2153       Medications:      sodium chloride flush  5-40 mL IntraVENous 2 times per day    carvedilol  25 mg Oral BID WC    atorvastatin  40 mg Oral Nightly    DULoxetine  20 mg Oral Nightly    isosorbide mononitrate  30 mg Oral Daily    heparin (porcine)  5,000 Units SubCUTAneous 3 times per day    pantoprazole (PROTONIX) 40 mg in sodium chloride (PF) 0.9 % 10 mL injection  40 mg IntraVENous Daily    midodrine  2.5 mg Oral TID WC    meropenem  1,000 mg IntraVENous Q12H       Electronically signed by Jaime Villatoro MD on 7/13/2024 at 11:00 AM      [facility]  Jaime Villatoro MD  [employer]  OFFICE: [phone]    Thank you for allowing us to participate in the care of this patient. Please call with questions.    This note is created with the assistance of a speech recognition program.  While intending to generate a document that actually reflects the content of the visit, the document can still have some errors including those of syntax and sound a like substitutions which may escape proof reading.  In such instances, actual meaning can be extrapolated by contextual diversion.

## 2024-07-13 NOTE — CONSULTS
IntraVENous, Q12H, Clinton Porras MD, Stopped at 07/12/24 2325    dexmedeTOMIDine (PRECEDEX) 400 mcg in sodium chloride 0.9 % 100 mL infusion, 0.1-1.5 mcg/kg/hr, IntraVENous, Continuous, Clinton Porras MD, Last Rate: 7.9 mL/hr at 07/13/24 0018, 0.4 mcg/kg/hr at 07/13/24 0018    hyoscyamine (LEVSIN/SL) sublingual tablet 125 mcg, 125 mcg, SubLINGual, Q4H PRN, Arley Johnson MD, 125 mcg at 07/12/24 2351    ipratropium 0.5 mg-albuterol 2.5 mg (DUONEB) nebulizer solution 1 Dose, 1 Dose, Inhalation, Q4H PRN, Nathaniel Chacon MD    phenazopyridine (PYRIDIUM) tablet 200 mg, 200 mg, Oral, TID PRN, Arley Johnson MD    Allergies:    Allergies   Allergen Reactions    Fentanyl Hallucinations     Pt sensitive to medication - hallucinates       Social History:   Social History     Socioeconomic History    Marital status: Unknown     Spouse name: Not on file    Number of children: Not on file    Years of education: Not on file    Highest education level: Not on file   Occupational History    Not on file   Tobacco Use    Smoking status: Not on file    Smokeless tobacco: Not on file   Substance and Sexual Activity    Alcohol use: Not on file    Drug use: Not on file    Sexual activity: Not on file   Other Topics Concern    Not on file   Social History Narrative    Not on file     Social Determinants of Health     Financial Resource Strain: Not on file   Food Insecurity: Not on file   Transportation Needs: Not on file   Physical Activity: Not on file   Stress: Not on file   Social Connections: Not on file   Intimate Partner Violence: Not on file   Housing Stability: Not on file       Family History:  No family history on file.    REVIEW OF SYSTEMS:  A comprehensive 14 point review of systems was obtained.  Review of Systems   Constitutional:  Negative for chills and fever.   Gastrointestinal:  Negative for abdominal pain, nausea and vomiting.   Genitourinary:  Positive for dysuria, frequency and urgency. Negative for flank pain.    Musculoskeletal:  Negative for arthralgias and myalgias.   Neurological:  Negative for dizziness, syncope and light-headedness.           Physical Exam:    This a 78 y.o. Female   Vitals:    07/13/24 0030   BP: 119/71   Pulse: 81   Resp: 15   Temp:    SpO2: 93%     OBJECTIVE:  /71   Pulse 81   Temp 97.9 °F (36.6 °C) (Oral)   Resp 15   Ht 1.62 m (5' 3.78\")   Wt 78.8 kg (173 lb 11.6 oz)   SpO2 93%   BMI 30.03 kg/m²     GENERAL ASSESSMENT: alert, oriented to person, place and time, no acute distress and no anxiety, depression or agitation  HEAD: normocephalic, atraumatic  ABDOMEN: not done and soft, non tender, normal bowel sounds, no palpable masses, no organomegaly, no inguinal hernia  LYMPH NODES: not done  BACK: not done and no tenderness in spine or flanks  FEMALE GENITOURINARY EXAM:   External catheter in place draining cloudy yellow urine     Labs:  Recent Labs     07/12/24  1809   WBC 14.4*   HGB 11.0*   HCT 36.9   MCV 97.9        Recent Labs     07/12/24  1809   *   K 4.4   *   CO2 10*   BUN 28*   CREATININE 1.6*       No results for input(s): \"COLORU\", \"PHUR\", \"LABCAST\", \"WBCUA\", \"RBCUA\", \"MUCUS\", \"TRICHOMONAS\", \"YEAST\", \"BACTERIA\", \"CLARITYU\", \"SPECGRAV\", \"LEUKOCYTESUR\", \"UROBILINOGEN\", \"BILIRUBINUR\", \"BLOODU\" in the last 72 hours.    Invalid input(s): \"NITRATE\", \"GLUCOSEUKETONESUAMORPHOUS\"    Culture Results:  Results       Procedure Component Value Units Date/Time    Culture, Urine [[id number]] Collected: 07/12/24 2153    Order Status: Sent Specimen: Urine, clean catch Updated: 07/12/24 2153    Culture, Blood 1 [[id number]] Collected: 07/12/24 1725    Order Status: Completed Specimen: Blood Updated: 07/12/24 1846     Specimen Description .BLOOD     Special Requests          Culture NO GROWTH <24 HRS    Culture, Blood 2 [[id number]] Collected: 07/12/24 1725    Order Status: Completed Specimen: Blood Updated: 07/12/24 1846     Specimen Description .BLOOD     Special Requests

## 2024-07-14 ENCOUNTER — APPOINTMENT (OUTPATIENT)
Dept: GENERAL RADIOLOGY | Age: 79
End: 2024-07-14
Attending: INTERNAL MEDICINE
Payer: MEDICARE

## 2024-07-14 PROBLEM — R10.9 ABDOMINAL CRAMPS: Status: ACTIVE | Noted: 2024-07-14

## 2024-07-14 PROBLEM — G93.40 ACUTE ENCEPHALOPATHY: Status: ACTIVE | Noted: 2024-07-14

## 2024-07-14 PROBLEM — R65.21 SEPTIC SHOCK (HCC): Status: ACTIVE | Noted: 2024-07-12

## 2024-07-14 LAB
ANION GAP SERPL CALCULATED.3IONS-SCNC: 13 MMOL/L (ref 9–16)
BASOPHILS # BLD: 0 K/UL (ref 0–0.2)
BASOPHILS NFR BLD: 0 % (ref 0–2)
BUN SERPL-MCNC: 21 MG/DL (ref 8–23)
CALCIUM SERPL-MCNC: 8.4 MG/DL (ref 8.6–10.4)
CHLORIDE SERPL-SCNC: 115 MMOL/L (ref 98–107)
CO2 SERPL-SCNC: 15 MMOL/L (ref 20–31)
CREAT SERPL-MCNC: 1.3 MG/DL (ref 0.5–0.9)
EOSINOPHIL # BLD: 0.15 K/UL (ref 0–0.44)
EOSINOPHILS RELATIVE PERCENT: 2 % (ref 1–4)
ERYTHROCYTE [DISTWIDTH] IN BLOOD BY AUTOMATED COUNT: 15.8 % (ref 11.8–14.4)
GFR, ESTIMATED: 40 ML/MIN/1.73M2
GLUCOSE SERPL-MCNC: 121 MG/DL (ref 74–99)
HCT VFR BLD AUTO: 33.6 % (ref 36.3–47.1)
HGB BLD-MCNC: 10.1 G/DL (ref 11.9–15.1)
IMM GRANULOCYTES # BLD AUTO: 0.15 K/UL (ref 0–0.3)
IMM GRANULOCYTES NFR BLD: 2 %
LYMPHOCYTES NFR BLD: 0.54 K/UL (ref 1.1–3.7)
LYMPHOCYTES RELATIVE PERCENT: 7 % (ref 24–43)
MAGNESIUM SERPL-MCNC: 1.7 MG/DL (ref 1.6–2.4)
MCH RBC QN AUTO: 28.5 PG (ref 25.2–33.5)
MCHC RBC AUTO-ENTMCNC: 30.1 G/DL (ref 28.4–34.8)
MCV RBC AUTO: 94.6 FL (ref 82.6–102.9)
MONOCYTES NFR BLD: 0.54 K/UL (ref 0.1–1.2)
MONOCYTES NFR BLD: 7 % (ref 3–12)
MORPHOLOGY: ABNORMAL
NEUTROPHILS NFR BLD: 82 % (ref 36–65)
NEUTS SEG NFR BLD: 6.32 K/UL (ref 1.5–8.1)
NRBC BLD-RTO: 0 PER 100 WBC
PLATELET # BLD AUTO: 257 K/UL (ref 138–453)
PMV BLD AUTO: 8.9 FL (ref 8.1–13.5)
POTASSIUM SERPL-SCNC: 3.2 MMOL/L (ref 3.7–5.3)
RBC # BLD AUTO: 3.55 M/UL (ref 3.95–5.11)
SODIUM SERPL-SCNC: 143 MMOL/L (ref 136–145)
WBC OTHER # BLD: 7.7 K/UL (ref 3.5–11.3)

## 2024-07-14 PROCEDURE — 6360000002 HC RX W HCPCS

## 2024-07-14 PROCEDURE — 99233 SBSQ HOSP IP/OBS HIGH 50: CPT | Performed by: INTERNAL MEDICINE

## 2024-07-14 PROCEDURE — 71045 X-RAY EXAM CHEST 1 VIEW: CPT

## 2024-07-14 PROCEDURE — 80048 BASIC METABOLIC PNL TOTAL CA: CPT

## 2024-07-14 PROCEDURE — 2060000000 HC ICU INTERMEDIATE R&B

## 2024-07-14 PROCEDURE — 99291 CRITICAL CARE FIRST HOUR: CPT | Performed by: INTERNAL MEDICINE

## 2024-07-14 PROCEDURE — 2500000003 HC RX 250 WO HCPCS

## 2024-07-14 PROCEDURE — 6370000000 HC RX 637 (ALT 250 FOR IP)

## 2024-07-14 PROCEDURE — 36415 COLL VENOUS BLD VENIPUNCTURE: CPT

## 2024-07-14 PROCEDURE — 2580000003 HC RX 258

## 2024-07-14 PROCEDURE — 85025 COMPLETE CBC W/AUTO DIFF WBC: CPT

## 2024-07-14 PROCEDURE — 83735 ASSAY OF MAGNESIUM: CPT

## 2024-07-14 RX ORDER — ALPRAZOLAM 0.25 MG/1
0.25 TABLET ORAL 3 TIMES DAILY PRN
Status: DISCONTINUED | OUTPATIENT
Start: 2024-07-14 | End: 2024-07-15

## 2024-07-14 RX ORDER — OXYCODONE HYDROCHLORIDE 5 MG/1
5 TABLET ORAL EVERY 6 HOURS PRN
Status: DISCONTINUED | OUTPATIENT
Start: 2024-07-14 | End: 2024-07-17 | Stop reason: HOSPADM

## 2024-07-14 RX ADMIN — SODIUM CHLORIDE, PRESERVATIVE FREE 10 ML: 5 INJECTION INTRAVENOUS at 20:53

## 2024-07-14 RX ADMIN — ACETAMINOPHEN 650 MG: 325 TABLET ORAL at 20:07

## 2024-07-14 RX ADMIN — DULOXETINE HYDROCHLORIDE 20 MG: 20 CAPSULE, DELAYED RELEASE ORAL at 20:52

## 2024-07-14 RX ADMIN — MEROPENEM 1000 MG: 1 INJECTION INTRAVENOUS at 11:56

## 2024-07-14 RX ADMIN — HEPARIN SODIUM 5000 UNITS: 5000 INJECTION INTRAVENOUS; SUBCUTANEOUS at 20:56

## 2024-07-14 RX ADMIN — SODIUM CHLORIDE: 9 INJECTION, SOLUTION INTRAVENOUS at 09:06

## 2024-07-14 RX ADMIN — MIDODRINE HYDROCHLORIDE 5 MG: 5 TABLET ORAL at 09:00

## 2024-07-14 RX ADMIN — OXYCODONE 5 MG: 5 TABLET ORAL at 22:27

## 2024-07-14 RX ADMIN — SODIUM CHLORIDE, PRESERVATIVE FREE 10 ML: 5 INJECTION INTRAVENOUS at 09:56

## 2024-07-14 RX ADMIN — ALPRAZOLAM 0.25 MG: 0.25 TABLET ORAL at 20:20

## 2024-07-14 RX ADMIN — DEXMEDETOMIDINE HYDROCHLORIDE 0.4 MCG/KG/HR: 4 INJECTION, SOLUTION INTRAVENOUS at 02:58

## 2024-07-14 RX ADMIN — HYOSCYAMINE SULFATE 125 MCG: 0.12 TABLET SUBLINGUAL at 01:19

## 2024-07-14 RX ADMIN — DESMOPRESSIN ACETATE 40 MG: 0.2 TABLET ORAL at 20:20

## 2024-07-14 RX ADMIN — HYOSCYAMINE SULFATE 125 MCG: 0.12 TABLET SUBLINGUAL at 19:33

## 2024-07-14 RX ADMIN — HYOSCYAMINE SULFATE 125 MCG: 0.12 TABLET SUBLINGUAL at 14:17

## 2024-07-14 RX ADMIN — SODIUM CHLORIDE, POTASSIUM CHLORIDE, SODIUM LACTATE AND CALCIUM CHLORIDE: 600; 310; 30; 20 INJECTION, SOLUTION INTRAVENOUS at 23:06

## 2024-07-14 RX ADMIN — POTASSIUM CHLORIDE 20 MEQ: 29.8 INJECTION, SOLUTION INTRAVENOUS at 10:16

## 2024-07-14 RX ADMIN — PANTOPRAZOLE SODIUM 40 MG: 40 INJECTION, POWDER, FOR SOLUTION INTRAVENOUS at 09:55

## 2024-07-14 RX ADMIN — SODIUM CHLORIDE, POTASSIUM CHLORIDE, SODIUM LACTATE AND CALCIUM CHLORIDE: 600; 310; 30; 20 INJECTION, SOLUTION INTRAVENOUS at 09:08

## 2024-07-14 RX ADMIN — HEPARIN SODIUM 5000 UNITS: 5000 INJECTION INTRAVENOUS; SUBCUTANEOUS at 06:00

## 2024-07-14 RX ADMIN — POTASSIUM CHLORIDE 20 MEQ: 29.8 INJECTION, SOLUTION INTRAVENOUS at 09:07

## 2024-07-14 RX ADMIN — HEPARIN SODIUM 5000 UNITS: 5000 INJECTION INTRAVENOUS; SUBCUTANEOUS at 14:05

## 2024-07-14 RX ADMIN — MEROPENEM 1000 MG: 1 INJECTION INTRAVENOUS at 20:55

## 2024-07-14 RX ADMIN — PHENAZOPYRIDINE HYDROCHLORIDE 200 MG: 100 TABLET ORAL at 09:55

## 2024-07-14 RX ADMIN — ALPRAZOLAM 0.25 MG: 0.25 TABLET ORAL at 14:17

## 2024-07-14 RX ADMIN — ONDANSETRON 4 MG: 2 INJECTION INTRAMUSCULAR; INTRAVENOUS at 22:33

## 2024-07-14 ASSESSMENT — PAIN SCALES - GENERAL
PAINLEVEL_OUTOF10: 8
PAINLEVEL_OUTOF10: 10
PAINLEVEL_OUTOF10: 0
PAINLEVEL_OUTOF10: 0
PAINLEVEL_OUTOF10: 6
PAINLEVEL_OUTOF10: 8
PAINLEVEL_OUTOF10: 4
PAINLEVEL_OUTOF10: 0
PAINLEVEL_OUTOF10: 5

## 2024-07-14 ASSESSMENT — PAIN SCALES - WONG BAKER
WONGBAKER_NUMERICALRESPONSE: NO HURT

## 2024-07-14 ASSESSMENT — PAIN DESCRIPTION - DESCRIPTORS
DESCRIPTORS: CRAMPING
DESCRIPTORS: CRAMPING;BURNING
DESCRIPTORS: DISCOMFORT;CRAMPING

## 2024-07-14 ASSESSMENT — PAIN DESCRIPTION - LOCATION
LOCATION: VULVA;ABDOMEN
LOCATION: ABDOMEN
LOCATION: ABDOMEN
LOCATION: ABDOMEN;VULVA
LOCATION: ABDOMEN

## 2024-07-14 NOTE — PLAN OF CARE
Problem: Discharge Planning  Goal: Discharge to home or other facility with appropriate resources  7/14/2024 1127 by Lucrecia Pena RN  Outcome: Progressing  7/14/2024 0402 by Desirae Noland RN  Outcome: Progressing     Problem: Pain  Goal: Verbalizes/displays adequate comfort level or baseline comfort level  7/14/2024 1127 by Lucrecia Pena RN  Outcome: Progressing  7/14/2024 0402 by Desirae Noland RN  Outcome: Progressing     Problem: Safety - Adult  Goal: Free from fall injury  7/14/2024 1127 by Lucrecia Pena RN  Outcome: Progressing  7/14/2024 0402 by Desirae Noland RN  Outcome: Progressing     Problem: Skin/Tissue Integrity  Goal: Absence of new skin breakdown  Description: 1.  Monitor for areas of redness and/or skin breakdown  2.  Assess vascular access sites hourly  3.  Every 4-6 hours minimum:  Change oxygen saturation probe site  4.  Every 4-6 hours:  If on nasal continuous positive airway pressure, respiratory therapy assess nares and determine need for appliance change or resting period.  7/14/2024 1127 by Lucrecia Pena RN  Outcome: Progressing  7/14/2024 0402 by Desirae Noland RN  Outcome: Progressing     Problem: ABCDS Injury Assessment  Goal: Absence of physical injury  7/14/2024 1127 by Lucrecia Pena RN  Outcome: Progressing  7/14/2024 0402 by Desirae Noland RN  Outcome: Progressing     Problem: Confusion  Goal: Confusion, delirium, dementia, or psychosis is improved or at baseline  Description: INTERVENTIONS:  1. Assess for possible contributors to thought disturbance, including medications, impaired vision or hearing, underlying metabolic abnormalities, dehydration, psychiatric diagnoses, and notify attending LIP  2. [place] high risk fall precautions, as indicated  3. Provide frequent short contacts to provide reality reorientation, refocusing and direction  4. Decrease environmental stimuli, including noise as appropriate  5.

## 2024-07-14 NOTE — PROGRESS NOTES
[facility]  Progress Note    Suzy Flaherty  MRN: [id number]  Date: 2024  Admission date  2024      Reason for F/U :   Septic shock    Impression :   Urethral stricture  Hydronephrosis   Prior hydronephrosis and stent placements in   Stent exchanges every 4 months.   Last exchange 24 after UTI with Klebsiella   Last UTI with Enterococcus on 24  ? Urine retention and foster placed  Septic shock  - U cx and BC so far neg  Abd cramps  CKD stage IV  Hyperkalemia  Acute encephalopathy - improved  Hyponatremia    Recommendations:   Keep meropenem  cloudy urine but U cx and BC still are neg   CXR personally reviewed - no pneumonia  Watch abd cramps      Infection Control Recommendations:   Universal precaution      Medical Decision making / Summary of Stay:   Suzy Flaherty is a 78 y.o.-year-old female who was initially admitted on 2024. Patient seen at the request of Dr. Chacon     INITIAL HISTORY:     Patient with a prior Hx of distal urethral stricture and hydronephrosis.     Usually followed at [facility]. Had prior hydronephrosis and stent placements in . Since then stent exchanges every 4 months.   Prior exchange 24 after she had UTI with Klebsiella which responded to treatment.     Hx of Urinary tract infections associated to stents.  Last UTI with Enterococcus on 24.     Had ureteral stent exchanged on 24.     Patient presented through ER with complaints of altered mentation. Found to be in Septic shock with associated CKD stage IV and hyperkalemia (K 6.5).     Patient transferred to ICU.     ID service asked to evaluate and advice on treatment.     Current evaluation:2024    BP (!) 119/47   Pulse 75   Temp 99.3 °F (37.4 °C) (Axillary)   Resp 19   Ht 1.62 m (5' 3.78\")   Wt 78.8 kg (173 lb 11.6 oz)   SpO2 94%   BMI 30.03 kg/m²     Temperature Range: Temp: 99.3 °F (37.4 °C) Temp  Av.9 °F (37.2 °C)  Min: 98.1 °F (36.7 °C)  Max:

## 2024-07-14 NOTE — PLAN OF CARE
Problem: Discharge Planning  Goal: Discharge to home or other facility with appropriate resources  Outcome: Progressing     Problem: Pain  Goal: Verbalizes/displays adequate comfort level or baseline comfort level  7/14/2024 0402 by Desirae Noland RN  Outcome: Progressing  7/13/2024 1803 by Anamika Quevedo RN  Outcome: Progressing     Problem: Safety - Adult  Goal: Free from fall injury  7/14/2024 0402 by Desirae Noland RN  Outcome: Progressing  7/13/2024 1803 by Anamika Quevedo RN  Outcome: Progressing     Problem: Skin/Tissue Integrity  Goal: Absence of new skin breakdown  Description: 1.  Monitor for areas of redness and/or skin breakdown  2.  Assess vascular access sites hourly  3.  Every 4-6 hours minimum:  Change oxygen saturation probe site  4.  Every 4-6 hours:  If on nasal continuous positive airway pressure, respiratory therapy assess nares and determine need for appliance change or resting period.  7/14/2024 0402 by Desirae Noland RN  Outcome: Progressing  7/13/2024 1803 by Anamika Quevedo RN  Outcome: Progressing     Problem: ABCDS Injury Assessment  Goal: Absence of physical injury  Outcome: Progressing     Problem: Confusion  Goal: Confusion, delirium, dementia, or psychosis is improved or at baseline  Description: INTERVENTIONS:  1. Assess for possible contributors to thought disturbance, including medications, impaired vision or hearing, underlying metabolic abnormalities, dehydration, psychiatric diagnoses, and notify attending LIP  2. [place] high risk fall precautions, as indicated  3. Provide frequent short contacts to provide reality reorientation, refocusing and direction  4. Decrease environmental stimuli, including noise as appropriate  5. Monitor and intervene to maintain adequate nutrition, hydration, elimination, sleep and activity  6. If unable to ensure safety without constant attention obtain sitter and review sitter guidelines with assigned personnel  7. Initiate

## 2024-07-14 NOTE — TRANSITION OF CARE
Critical care team - Resident sign-out to medicine service      Date and time: 7/15/2024 4:08 PM  Patient's name:  Suzy Flaherty  Medical Record Number: [id number]  Patient's account/billing number: [id number]  Patient's YOB: 1945  Age: 78 y.o.  Date of Admission: 7/12/2024  4:04 PM  Length of stay during current admission: 3    Primary Care Physician: No primary care provider on file.    Code Status: Full Code    Mode of physician to physician communication:        [x] Via telephone   [] In person     Date and time of sign-out: 7/15/2024 4:22 PM    Accepting Internal Medicine resident: Dr. Nadya Prado  Accepting Medicine team: IM Team Med 1  Accepting team's attending: Dr. Lilian Briseno MD    Patient's current ICU Bed:  3016     Patient's assigned bed on floor:  417        [] Med-Surg Monitored [x] Step-down       [] Psychiatry ICU       [] Psych floor     Reason for ICU admission:     Septic Shock due to Urosepsis     ICU course summary:     Suzy Flaherty is a 78 y.o. with Pmhx of   Chronic Overactive Bladder, Prior Ureteral Stricture S/P Stent Exchange 7/11/2024, CKD Stage IV, presented to the ED for Hyperkalemia of 6.5 with EKG Changes , new onset Hydronephrosis, Hypotension. Patient in ED had UA showing evidence of Elevated Leucocyte Esterase. Patient initially BP was controlled with 2L LR Bolus but than became Hypotensive with recorded Systolic BP of  70's. Started and Progressed to Levophed at 8 mcg/min. Patient at her baseline mentation is A&O x 2 but today was somnolent and appeared confused but not agitated . Patient was transferred to ICU due to AMS and Hypotensive episodes requiring Vasopressors.   Patient was started on Meropenem. Patient is off vasopressors, started on Ativan PRN for anxiousness. K 3.2, replaced Continue Meropenem per ID.Started Melatonin 5 mg nightly. Discontinued xanax as patient has delirium. Consulted Urology and palliative care today and  CT Abdomen and pelvis for follow up on Right flank pain today.     Procedures during patient's ICU stay:     None     Current Vitals:     /74   Pulse 92   Temp 99.3 °F (37.4 °C) (Axillary)   Resp 19   Ht 1.62 m (5' 3.78\")   Wt 78.8 kg (173 lb 11.6 oz)   SpO2 90%   BMI 30.03 kg/m²       Cultures:     Blood cultures:                 [] None drawn      [x] Negative             []  Positive (Details:  )  Urine Culture:                   [] None drawn      [x] Negative             []  Positive (Details:  )  Sputum Culture:               [x] None drawn       [] Negative             []  Positive (Details:  )   Endotracheal aspirate:     [x] None drawn       [] Negative             []  Positive (Details:  )       Consults:     1. Urology  2. Infectious Disease       Assessment:     Patient Active Problem List    Diagnosis Date Noted    Abdominal cramps 07/14/2024    Acute encephalopathy 07/14/2024    Urinary tract infection without hematuria 07/13/2024    Septic shock (HCC) 07/12/2024       Additional assessment:    Hyperkalemia    Recommended Follow-up:     Follow up ID recommendation for Infection and Urology  Urine Culture Negative as well as Blood Culture x 2  MRSA nasal swab positive likely Asymptomatic Carrier (SNF resident)   Dc xanax as patient has delirium   Started Melatonin 5mg for sleep cycle  [place] monitor bedside  Monitor for delirium  Patient on LR, plan to decrease infusion as required        Above mentioned assessment and plan was discussed by me with the admitting medicine resident. The medicine team assigned to the patient by medicine admitting resident will be following up the patient from now onwards on the floor.     Donald Guevara MD,   Critical care resident PGY1  Department of Internal Medicine/ Critical care  [facility])             7/15/2024, 4:08 PM

## 2024-07-14 NOTE — PROGRESS NOTES
INTENSIVE CARE UNIT  Resident Physician Progress Note    Patient - Suzy Flaherty  Date of Admission -  2024  4:04 PM  Date of Evaluation -  2024  Room and Bed Number -  3016/3016-01   Hospital Day - 2      SUBJECTIVE:     OVERNIGHT EVENTS:  No acute events overnight       TODAY:  Patient more alert, responsive, made sarcastic remark which showed overall improved mentation     AWAKE & FOLLOWING COMMANDS:  [] No   [x] Yes    SECRETIONS Amount:  [] Small [] Moderate  [] Large  [x] None  Color:     [] White [] Colored  [] Bloody    SEDATION:  RAAS Score:  [] Propofol gtt  [] Versed gtt  [] Ativan gtt   [x] No Sedation    PARALYZED:  [x] No    [] Yes    VASOPRESSORS:  [x] No    [] Yes  [] Levophed [] Dopamine [] Vasopressin  [] Dobutamine [] Phenylephrine [] Epinephrine    F.A.S.T. M. H.U.G.S. B.I.D.    Feeding Diet: Diet NPO Exceptions are: Sips of Water with Meds   Fluids: LR @ 75 ml   Family: Allie Davies (Daughter) [phone]   Analgesic: Acetaminophen, Pyridium   Sedation: Off Precedex    Thrombo-prophylaxis: [] Enoxaparin, [x] Unfract. Heparin Subcutaneously, [] EPC Cuffs  Mobility: Bed Rest   Heads up: Yes  Ulcer prophylaxis: [x] PPI Agent, [] A3Kwszd, [] Sucralfate, [] Other:  Glycemic control: None   Spontaneous breathing trial: N/A   Bowel regimen/urine output: UOP 2075 ml   Indwelling catheter/lines: Angelo Catheter, PICC Line right side, Left peripheral IV   De-escalation: No F/U ID        OBJECTIVE:     VITAL SIGNS:  /60   Pulse 84   Temp 98.1 °F (36.7 °C) (Oral)   Resp 19   Ht 1.62 m (5' 3.78\")   Wt 78.8 kg (173 lb 11.6 oz)   SpO2 93%   BMI 30.03 kg/m²   Tmax over 24 hours:  Temp (24hrs), Av.6 °F (37 °C), Min:97.8 °F (36.6 °C), Max:99.9 °F (37.7 °C)      Patient Vitals for the past 8 hrs:   BP Temp Temp src Pulse Resp SpO2   24 0645 115/60 -- -- 84 19 93 %   24 0630 (!) 131/56 -- -- 82 18 94 %   24 0615 (!) 125/114 -- -- 85 17 94 %   24 0600 132/74 -- --  \"EDD3WPD\", \"HCO3\", \"BEART\", \"BE\", \"THGBART\", \"THB\", \"XHA3NVP\", \"V2RQHHMX\", \"O2SAT\", \"FIO2\"      DATA:  Complete Blood Count:   Recent Labs     07/12/24  1809 07/13/24 0311 07/14/24  0701   WBC 14.4* 13.7* 7.7   RBC 3.77* 3.71* 3.55*   HGB 11.0* 10.7* 10.1*   HCT 36.9 35.4* 33.6*   MCV 97.9 95.4 94.6   MCH 29.2 28.8 28.5   MCHC 29.8 30.2 30.1   RDW 15.3* 15.2* 15.8*    300 257   MPV 8.8 8.7 8.9        Last 3 Blood Glucose:   Recent Labs     07/12/24 1809 07/13/24  0311   GLUCOSE 166* 130*        PT/INR:  No results found for: \"PROTIME\", \"INR\"  PTT:  No results found for: \"APTT\"    Comprehensive Metabolic Profile:   Recent Labs     07/12/24 1809 07/13/24 0311   * 138   K 4.4 3.9   * 115*   CO2 10* 11*   BUN 28* 27*   CREATININE 1.6* 1.6*   GLUCOSE 166* 130*   CALCIUM 8.2* 8.5*   BILITOT <0.2  --    ALKPHOS 107*  --    AST 14  --    ALT <5*  --       Magnesium: No results found for: \"MG\"  Phosphorus: No results found for: \"PHOS\"  Ionized Calcium: No results found for: \"CAION\"     Urinalysis: No results found for: \"NITRU\", \"COLORU\", \"PHUR\", \"LABCAST\", \"WBCUA\", \"RBCUA\", \"MUCUS\", \"TRICHOMONAS\", \"YEAST\", \"BACTERIA\", \"CLARITYU\", \"SPECGRAV\", \"LEUKOCYTESUR\", \"UROBILINOGEN\", \"BILIRUBINUR\", \"BLOODU\", \"GLUCOSEU\", \"KETUA\", \"AMORPHOUS\"    HgBA1c:  No results found for: \"LABA1C\"  TSH:  No results found for: \"TSH\"  Lactic Acid: No results found for: \"LACTA\"   Troponin: No results for input(s): \"TROPONINI\" in the last 72 hours.      ASSESSMENT:     Patient Active Problem List    Diagnosis Date Noted    Urinary tract infection without hematuria 07/13/2024    Sepsis (HCC) 07/12/2024          PLAN:     WEAN PER PROTOCOL:  [] No   [] Yes  [x] N/A    ICU PROPHYLAXIS:  Stress ulcer:  [x] PPI Agent  [] O4Xowea [] Sucralfate  [] Other:  VTE:   [] Enoxaparin  [x] Unfract. Heparin Subcut  [] EPC Cuffs    NUTRITION:  [x] NPO [] Tube Feeding (Specify: ) [] TPN  [] PO    HOME MEDS RECONCILED: [] No  [x] Yes    CONSULTATION

## 2024-07-15 ENCOUNTER — APPOINTMENT (OUTPATIENT)
Age: 79
End: 2024-07-15
Attending: INTERNAL MEDICINE
Payer: MEDICARE

## 2024-07-15 PROBLEM — R41.0 DELIRIUM: Status: ACTIVE | Noted: 2024-07-15

## 2024-07-15 PROBLEM — N10 ACUTE PYELONEPHRITIS: Status: ACTIVE | Noted: 2024-07-15

## 2024-07-15 LAB
BASOPHILS # BLD: 0.03 K/UL (ref 0–0.2)
BASOPHILS NFR BLD: 0 % (ref 0–2)
EOSINOPHIL # BLD: 0.63 K/UL (ref 0–0.44)
EOSINOPHILS RELATIVE PERCENT: 7 % (ref 1–4)
ERYTHROCYTE [DISTWIDTH] IN BLOOD BY AUTOMATED COUNT: 16 % (ref 11.8–14.4)
GLUCOSE BLD-MCNC: 98 MG/DL (ref 65–105)
HCT VFR BLD AUTO: 30.2 % (ref 36.3–47.1)
HGB BLD-MCNC: 9.9 G/DL (ref 11.9–15.1)
IMM GRANULOCYTES # BLD AUTO: 0.16 K/UL (ref 0–0.3)
IMM GRANULOCYTES NFR BLD: 2 %
LYMPHOCYTES NFR BLD: 1.29 K/UL (ref 1.1–3.7)
LYMPHOCYTES RELATIVE PERCENT: 14 % (ref 24–43)
MCH RBC QN AUTO: 29.1 PG (ref 25.2–33.5)
MCHC RBC AUTO-ENTMCNC: 32.8 G/DL (ref 28.4–34.8)
MCV RBC AUTO: 88.8 FL (ref 82.6–102.9)
MONOCYTES NFR BLD: 0.83 K/UL (ref 0.1–1.2)
MONOCYTES NFR BLD: 9 % (ref 3–12)
NEUTROPHILS NFR BLD: 68 % (ref 36–65)
NEUTS SEG NFR BLD: 6.37 K/UL (ref 1.5–8.1)
NRBC BLD-RTO: 0 PER 100 WBC
PLATELET # BLD AUTO: 272 K/UL (ref 138–453)
PMV BLD AUTO: 9.3 FL (ref 8.1–13.5)
RBC # BLD AUTO: 3.4 M/UL (ref 3.95–5.11)
RBC # BLD: ABNORMAL 10*6/UL
WBC OTHER # BLD: 9.3 K/UL (ref 3.5–11.3)

## 2024-07-15 PROCEDURE — 6360000002 HC RX W HCPCS

## 2024-07-15 PROCEDURE — 6370000000 HC RX 637 (ALT 250 FOR IP)

## 2024-07-15 PROCEDURE — 99291 CRITICAL CARE FIRST HOUR: CPT | Performed by: INTERNAL MEDICINE

## 2024-07-15 PROCEDURE — 2580000003 HC RX 258

## 2024-07-15 PROCEDURE — 82947 ASSAY GLUCOSE BLOOD QUANT: CPT

## 2024-07-15 PROCEDURE — 36415 COLL VENOUS BLD VENIPUNCTURE: CPT

## 2024-07-15 PROCEDURE — 85025 COMPLETE CBC W/AUTO DIFF WBC: CPT

## 2024-07-15 PROCEDURE — 2060000000 HC ICU INTERMEDIATE R&B

## 2024-07-15 PROCEDURE — 6370000000 HC RX 637 (ALT 250 FOR IP): Performed by: INTERNAL MEDICINE

## 2024-07-15 PROCEDURE — 74176 CT ABD & PELVIS W/O CONTRAST: CPT

## 2024-07-15 PROCEDURE — 94761 N-INVAS EAR/PLS OXIMETRY MLT: CPT

## 2024-07-15 PROCEDURE — 99233 SBSQ HOSP IP/OBS HIGH 50: CPT | Performed by: INTERNAL MEDICINE

## 2024-07-15 RX ORDER — PANTOPRAZOLE SODIUM 40 MG/1
40 TABLET, DELAYED RELEASE ORAL
Status: DISCONTINUED | OUTPATIENT
Start: 2024-07-16 | End: 2024-07-17 | Stop reason: HOSPADM

## 2024-07-15 RX ORDER — UREA 10 %
5 LOTION (ML) TOPICAL NIGHTLY
Status: DISCONTINUED | OUTPATIENT
Start: 2024-07-15 | End: 2024-07-17 | Stop reason: HOSPADM

## 2024-07-15 RX ADMIN — HEPARIN SODIUM 5000 UNITS: 5000 INJECTION INTRAVENOUS; SUBCUTANEOUS at 21:26

## 2024-07-15 RX ADMIN — OXYCODONE 5 MG: 5 TABLET ORAL at 09:17

## 2024-07-15 RX ADMIN — SODIUM CHLORIDE, POTASSIUM CHLORIDE, SODIUM LACTATE AND CALCIUM CHLORIDE: 600; 310; 30; 20 INJECTION, SOLUTION INTRAVENOUS at 18:26

## 2024-07-15 RX ADMIN — SODIUM CHLORIDE, PRESERVATIVE FREE 10 ML: 5 INJECTION INTRAVENOUS at 09:17

## 2024-07-15 RX ADMIN — DULOXETINE HYDROCHLORIDE 20 MG: 20 CAPSULE, DELAYED RELEASE ORAL at 21:25

## 2024-07-15 RX ADMIN — SODIUM CHLORIDE, PRESERVATIVE FREE 10 ML: 5 INJECTION INTRAVENOUS at 20:15

## 2024-07-15 RX ADMIN — MEROPENEM 1000 MG: 1 INJECTION INTRAVENOUS at 09:35

## 2024-07-15 RX ADMIN — PANTOPRAZOLE SODIUM 40 MG: 40 INJECTION, POWDER, FOR SOLUTION INTRAVENOUS at 09:17

## 2024-07-15 RX ADMIN — Medication 5 MG: at 20:05

## 2024-07-15 RX ADMIN — MEROPENEM 1000 MG: 1 INJECTION INTRAVENOUS at 20:58

## 2024-07-15 RX ADMIN — HEPARIN SODIUM 5000 UNITS: 5000 INJECTION INTRAVENOUS; SUBCUTANEOUS at 05:30

## 2024-07-15 RX ADMIN — MIDODRINE HYDROCHLORIDE 5 MG: 5 TABLET ORAL at 09:17

## 2024-07-15 RX ADMIN — HYOSCYAMINE SULFATE 125 MCG: 0.12 TABLET SUBLINGUAL at 01:43

## 2024-07-15 RX ADMIN — DESMOPRESSIN ACETATE 40 MG: 0.2 TABLET ORAL at 20:05

## 2024-07-15 RX ADMIN — SODIUM CHLORIDE, POTASSIUM CHLORIDE, SODIUM LACTATE AND CALCIUM CHLORIDE: 600; 310; 30; 20 INJECTION, SOLUTION INTRAVENOUS at 09:46

## 2024-07-15 ASSESSMENT — PAIN SCALES - GENERAL
PAINLEVEL_OUTOF10: 0
PAINLEVEL_OUTOF10: 0

## 2024-07-15 ASSESSMENT — PAIN SCALES - WONG BAKER: WONGBAKER_NUMERICALRESPONSE: NO HURT

## 2024-07-15 NOTE — PROGRESS NOTES
[facility] - Progress Note    Today's Date and Time: 7/15/2024, 6:13 AM    Impression :   Urethral stricture  Hydronephrosis  Prior hydronephrosis and stent placements in 2021  Stent exchanges every 4 months.   Last exchange 4-26-24 after UTI with Klebsiella   Urinary tract infections  Last UTI with Enterococcus on 5-24-24  Septic shock   CKD stage IV  Hyperkalemia  Altered mentation  Hyponatremia    Recommendations:   Meropenem 1 gm q 12 hr pending culture data    Medical Decision Making/Summary/Discussion:7/15/2024     Daily Elements of Decision Making provided by Consulting Physician:    Note: I have independently performed the steps listed below as part of the medical decision making and evaluation.    Review of current Problems:  Today I am seeing the patient for the following problems:  Urethral stricture  Hydronephrosis  Prior hydronephrosis and stent placements in 2021  Stent exchanges every 4 months.   Last exchange 4-26-24 after UTI with Klebsiella   Urinary tract infections  Last UTI with Enterococcus on 5-24-24  Septic shock   CKD stage IV  Hyperkalemia  Altered mentation  Evaluation of Patient:  Patient evaluated and examined in the ICU.   Patient with Hx of ureteral stents and recurrent UTIs   Evaluated because of septic shock  Please see daily details in Interval Changes Section  Changes in physical exam:  In shock  On Levophed  Please see daily details in Physical Exam section and in Interval Changes Section  Changes in ROS:  Unchanged  Please see daily details in Review of Symptoms section and in Interval Changes Section  Discussion with Referring Physician or Service  Dr. Chacon  Laboratory and Radiologic Studies with personal review of radiologic studies  Laboratory  Radiology  Microbiology  Please see Details in daily Interval Changes Section devoted to Lab, Micro and Radiology and in Medical Decision Laboratory, Imaging and Cultures sections.  Review of  input(s): \"BC\" in the last 72 hours.  Lab Results   Component Value Date/Time    RBC 3.40 07/15/2024 04:32 AM    WBC 9.3 07/15/2024 04:32 AM     Lab Results   Component Value Date/Time    CREATININE 1.3 07/14/2024 07:01 AM    GLUCOSE 121 07/14/2024 07:01 AM       Medical Decision Making-Imaging:   No results found.    Medical Decision Ccftyj-[id number]-Qbrpc:     Results       Procedure Component Value Units Date/Time    Culture, Urine [[id number]] Collected: 07/12/24 2153    Order Status: Completed Specimen: Urine, clean catch Updated: 07/13/24 2126     Specimen Description .CLEAN CATCH URINE     Special Requests Site: Urine     Culture NO SIGNIFICANT GROWTH    Culture, Blood 1 [[id number]] Collected: 07/12/24 1725    Order Status: Completed Specimen: Blood Updated: 07/14/24 1847     Specimen Description .BLOOD     Special Requests          Culture NO GROWTH 2 DAYS    Culture, Blood 2 [[id number]] Collected: 07/12/24 1725    Order Status: Completed Specimen: Blood Updated: 07/14/24 1846     Specimen Description .BLOOD     Special Requests Site: Blood     Culture NO GROWTH 2 DAYS    MRSA DNA Probe, Nasal [[id number]]  (Abnormal) Collected: 07/12/24 1654    Order Status: Completed Specimen: Nasal Updated: 07/13/24 1006     Specimen Description .NASAL SWAB     MRSA, DNA, Nasal POSITIVE:  MRSA DNA detected by nucleic acid amplification.     Comment:                                                   Results should be used as an adjunct to nosocomial control efforts to identify patients   needing enhanced precautions.    The test is not intended to identify patients with staphylococcal infections.  Results   should not be used to guide or monitor treatment for MRSA infections.         MRSA DNA Probe, Nasal [[id number]]     Order Status: Canceled Specimen: Nares               Medical Decision Making-Other:     Note:    Thank you for allowing us to participate in the care of this patient. Please call with questions.    Marlon

## 2024-07-15 NOTE — PROGRESS NOTES
1825h- Transferred patient to  room 417; vitally stable, on room air; oriented x1. File and Belongings given to RN.

## 2024-07-15 NOTE — CARE COORDINATION
Case Management Assessment  Initial Evaluation    Date/Time of Evaluation: 7/15/2024 12:18 PM  Assessment Completed by: YVES LOZANO RN    If patient is discharged prior to next notation, then this note serves as note for discharge by case management.    Patient Name: Suzy Flaherty                   YOB: 1945  Diagnosis: Sepsis (HCC) [A41.9]                   Date / Time: 7/12/2024  4:04 PM    Patient Admission Status: Inpatient   Readmission Risk (Low < 19, Mod (19-27), High > 27): Readmission Risk Score: 11.6    Current PCP: No primary care provider on file.  PCP verified by CM? Yes    Chart Reviewed: Yes      History Provided by: Child/Family (daughter jad)  Patient Orientation:  (not assessed)    Patient Cognition:  (not assessed)    Hospitalization in the last 30 days (Readmission):  No    If yes, Readmission Assessment in CM Navigator will be completed.    Advance Directives:      Code Status: Full Code   Patient's Primary Decision Maker is: Legal Next of Kin      Discharge Planning:    Patient lives with:  (long term care) Type of Home: Long-Term Care ([place])  Primary Care Giver: Other (Comment) (long term care)  Patient Support Systems include: Spouse/Significant Other, Other (Comment) (long term care)   Current Financial resources: Medicare  Current community resources: ECF/Home Care (long term care)  Current services prior to admission: Transportation, Extended Care Facility            Current DME:  from long term care            Type of Home Care services:   (long term care)    ADLS  Prior functional level: Assistance with the following: (long term [facility])  Current functional level: Assistance with the following: (long term care)    PT AM-PAC:   /24  OT AM-PAC:   /24    Family can provide assistance at DC: No  Would you like Case Management to discuss the discharge plan with any other family members/significant others, and if so, who? No  Plans to Return to Present Housing:  Yes  Other Identified Issues/Barriers to RETURNING to current housing: none  Potential Assistance needed at discharge: Transportation, Extended Care Facility            Patient expects to discharge to: Long-term care ([facility])  Plan for transportation at discharge:  (bls)    Financial    Payor: MEDICARE / Plan: MEDICARE PART A AND B / Product Type: *No Product type* /     Does insurance require precert for SNF: no    Potential assistance Purchasing Medications: No  Meds-to-Beds request: Yes    No Pharmacies Listed    Notes:    Factors facilitating achievement of predicted outcomes: Family support    Barriers to discharge: clinical status    Additional Case Management Notes: return to [facility]    The Plan for Transition of Care is related to the following treatment goals of Sepsis (HCC) [A41.9]    IF APPLICABLE: The Patient and/or patient representative Suzy and her family were provided with a choice of provider and agrees with the discharge plan. Freedom of choice list with basic dialogue that supports the patient's individualized plan of care/goals and shares the quality data associated with the providers was provided to: Patient Representative   Patient Representative Name: jad schmitt     The Patient and/or Patient Representative Agree with the Discharge Plan? Yes    YVES LOZANO RN  Case Management Department

## 2024-07-15 NOTE — PROGRESS NOTES
1530h- Report given to the RN assigned in Room 417; 4B. Awaiting for Sign out form from Provider. Family informed about the transfer.

## 2024-07-15 NOTE — PLAN OF CARE
Problem: Discharge Planning  Goal: Discharge to home or other facility with appropriate resources  7/15/2024 1245 by Lucrecia Pena RN  Outcome: Progressing  7/15/2024 0433 by Desirae Noland RN  Outcome: Progressing     Problem: Pain  Goal: Verbalizes/displays adequate comfort level or baseline comfort level  7/15/2024 1245 by Lucrecia Pena RN  Outcome: Progressing  Flowsheets (Taken 7/15/2024 0800)  Verbalizes/displays adequate comfort level or baseline comfort level: Assess pain using appropriate pain scale  7/15/2024 0433 by Desirae Noland RN  Outcome: Progressing     Problem: Safety - Adult  Goal: Free from fall injury  7/15/2024 1245 by Lucrecia Pena RN  Outcome: Progressing  7/15/2024 0433 by Desirae Noland RN  Outcome: Progressing     Problem: ABCDS Injury Assessment  Goal: Absence of physical injury  7/15/2024 1245 by Lucrecia Pena RN  Outcome: Progressing  7/15/2024 0433 by Desirae Noland RN  Outcome: Progressing     Problem: Confusion  Goal: Confusion, delirium, dementia, or psychosis is improved or at baseline  Description: INTERVENTIONS:  1. Assess for possible contributors to thought disturbance, including medications, impaired vision or hearing, underlying metabolic abnormalities, dehydration, psychiatric diagnoses, and notify attending LIP  2. [place] high risk fall precautions, as indicated  3. Provide frequent short contacts to provide reality reorientation, refocusing and direction  4. Decrease environmental stimuli, including noise as appropriate  5. Monitor and intervene to maintain adequate nutrition, hydration, elimination, sleep and activity  6. If unable to ensure safety without constant attention obtain sitter and review sitter guidelines with assigned personnel  7. Initiate Psychosocial CNS and Spiritual Care consult, as indicated  7/15/2024 1245 by Lucrecia Pena RN  Outcome: Progressing  7/15/2024 0433 by Desirae Noland

## 2024-07-15 NOTE — PROGRESS NOTES
\"WBCUA\", \"RBCUA\", \"MUCUS\", \"TRICHOMONAS\", \"YEAST\", \"BACTERIA\", \"CLARITYU\", \"SPECGRAV\", \"LEUKOCYTESUR\", \"UROBILINOGEN\", \"BILIRUBINUR\", \"BLOODU\", \"GLUCOSEU\", \"KETUA\", \"AMORPHOUS\"    HgBA1c:  No results found for: \"LABA1C\"  TSH:  No results found for: \"TSH\"  Lactic Acid: No results found for: \"LACTA\"   Troponin: No results for input(s): \"TROPONINI\" in the last 72 hours.    Microbiology:  Urine Culture:  No components found for: \"CURINE\"    Radiology/Imaging:  XR CHEST PORTABLE   Final Result   Trace patchy left lower lobe opacities may relate to atelectasis and/or   pneumonia.             ASSESSMENT:     Patient Active Problem List    Diagnosis Date Noted    Abdominal cramps 07/14/2024    Acute encephalopathy 07/14/2024    Urinary tract infection without hematuria 07/13/2024    Septic shock (HCC) 07/12/2024    Left Hydronephrosis, stent exchanges every 4 months  Ureteral stricure  Hyperkalemia    PLAN:      WEAN PER PROTOCOL:  []   No  [x]   Yes [x]   N/A                         ICU PROPHYLAXIS:                Stress ulcer:  [x]   PPI Agent []   R7Agmna []   Sucralfate []   Other []   None      VTE:  []   Enoxaparin [x]   SQ Heparin []   EPC Cuffs []  Other                       NUTRITION:   []  NPO []   TF:  []   TPN [x]   PO                       HOME MEDS RECONCILED:  []   No  [x]   Yes                           CONSULTATION NEEDED:  [x]   No  []   Yes                           FAMILY UPDATED:  []   No  [x]   Yes                           TRANSFER OUT OF ICU:  []   No  [x]   Yes             PLAN/MEDICAL DECISION MAKING:  Neurologic:   Neuro intact  Neuro checks per protocol  Sedation: No sedation  Stopped Precedex, dc xanax   Delirium   Cardiovascular:  Hemodynamically stable  MAP goal 65  Pulmonary:  Maintain oxygen sats >92%  Pulmonary toilet  GI/Nutrition  Ulcer Prophylaxis: Pantoprazole  Diet: ADULT DIET; Dysphagia - Soft and Bite Sized  Renal/Fluid/Electrolyte  IV Fluids: LR @ 75 ml /hr  I/O: In: 2035.6

## 2024-07-15 NOTE — PROGRESS NOTES
[facility]     Department of Internal Medicine - Staff Internal Medicine Service   ICU PATIENT TRANSFER NOTE        Patient:  Suzy Flaherty  YOB: 1945  MRN: [id number]     Acct: [id number]     Admit date: 7/12/2024    Code Status:-  Full code     Reason for ICU Admission:-       SUPPORT DEVICES: [] Ventilator [] BIPAP  [] Nasal Cannula [x] Room Air    Consultations:- [] Cardiology [x] Nephrology  [] Hemo onco  [] GI                               [x] ID [] ENT  [] Rheum [] Endo   [] Physiotherapy                                 Others:-     NUTRITION:  [] NPO [] Tube Feeding (Specify: ) [] TPN  [x] PO    Central Lines:- [x] No   [] Yes           If yes - Days/Date of Insertion.      Pt seen,examined and Chart reviewed.    ICU COURSE:    The patient is a 78 y.o. female with past medical history significant for prior ureteral stricture status post stent exchange on 7/11/2024 on, CKD stage IV who was initially admitted on 7/12/2024 with Sepsis (HCC) [A41.9] and presented with altered mental status..    In the ER she was found to have hyperkalemia of 6.5 with EKG changes, new onset hydronephrosis,.  UA showed evidence of elevated leukocyte esterase.  Initially patient's BP was controlled with 2 L of LR last but then she became hypotensive with recorded systolic blood pressure of 70s.  She was started and progressed to Levophed at 8 mcg/min.  Patient baseline mentation is AO x 2 but she was found to be somnolent and confused, therefore she was transferred to ICU because of AMS and hypotensive episodes requiring vasopressors.    In the ICU, ID and urology was consulted on 7/13/24.  Obtain patient was started on meropenem 1 g every 12 as per ID recommendations, Angelo catheter was placed as per urology recommendations, urine cultures were ordered, Levsin was added for bladder spasms, Pyridium was added for severe dysuria.  On 7/14/24, 4 patient was taken off vasopressors and Precedex,

## 2024-07-15 NOTE — PLAN OF CARE
Problem: Discharge Planning  Goal: Discharge to home or other facility with appropriate resources  Outcome: Progressing     Problem: Pain  Goal: Verbalizes/displays adequate comfort level or baseline comfort level  Outcome: Progressing     Problem: Safety - Adult  Goal: Free from fall injury  Outcome: Progressing     Problem: Skin/Tissue Integrity  Goal: Absence of new skin breakdown  Description: 1.  Monitor for areas of redness and/or skin breakdown  2.  Assess vascular access sites hourly  3.  Every 4-6 hours minimum:  Change oxygen saturation probe site  4.  Every 4-6 hours:  If on nasal continuous positive airway pressure, respiratory therapy assess nares and determine need for appliance change or resting period.  Outcome: Progressing     Problem: ABCDS Injury Assessment  Goal: Absence of physical injury  Outcome: Progressing     Problem: Confusion  Goal: Confusion, delirium, dementia, or psychosis is improved or at baseline  Description: INTERVENTIONS:  1. Assess for possible contributors to thought disturbance, including medications, impaired vision or hearing, underlying metabolic abnormalities, dehydration, psychiatric diagnoses, and notify attending LIP  2. [place] high risk fall precautions, as indicated  3. Provide frequent short contacts to provide reality reorientation, refocusing and direction  4. Decrease environmental stimuli, including noise as appropriate  5. Monitor and intervene to maintain adequate nutrition, hydration, elimination, sleep and activity  6. If unable to ensure safety without constant attention obtain sitter and review sitter guidelines with assigned personnel  7. Initiate Psychosocial CNS and Spiritual Care consult, as indicated  Outcome: Progressing     Problem: Respiratory - Adult  Goal: Achieves optimal ventilation and oxygenation  Outcome: Progressing     Problem: Cardiovascular - Adult  Goal: Maintains optimal cardiac output and hemodynamic stability  Outcome:

## 2024-07-16 PROBLEM — F03.B0 MODERATE DEMENTIA (HCC): Status: ACTIVE | Noted: 2024-07-16

## 2024-07-16 PROBLEM — Z71.89 ACP (ADVANCE CARE PLANNING): Status: ACTIVE | Noted: 2024-07-16

## 2024-07-16 PROBLEM — Z51.5 ENCOUNTER FOR PALLIATIVE CARE: Status: ACTIVE | Noted: 2024-07-16

## 2024-07-16 LAB
ANION GAP SERPL CALCULATED.3IONS-SCNC: 12 MMOL/L (ref 9–16)
BASOPHILS # BLD: 0.03 K/UL (ref 0–0.2)
BASOPHILS NFR BLD: 0 % (ref 0–2)
BUN SERPL-MCNC: 15 MG/DL (ref 8–23)
CALCIUM SERPL-MCNC: 7.9 MG/DL (ref 8.6–10.4)
CHLORIDE SERPL-SCNC: 110 MMOL/L (ref 98–107)
CO2 SERPL-SCNC: 19 MMOL/L (ref 20–31)
CREAT SERPL-MCNC: 1.2 MG/DL (ref 0.5–0.9)
EOSINOPHIL # BLD: 0.44 K/UL (ref 0–0.44)
EOSINOPHILS RELATIVE PERCENT: 5 % (ref 1–4)
ERYTHROCYTE [DISTWIDTH] IN BLOOD BY AUTOMATED COUNT: 16.2 % (ref 11.8–14.4)
GFR, ESTIMATED: 47 ML/MIN/1.73M2
GLUCOSE BLD-MCNC: 101 MG/DL (ref 65–105)
GLUCOSE SERPL-MCNC: 94 MG/DL (ref 74–99)
HCT VFR BLD AUTO: 30.3 % (ref 36.3–47.1)
HGB BLD-MCNC: 9.2 G/DL (ref 11.9–15.1)
IMM GRANULOCYTES # BLD AUTO: 0.12 K/UL (ref 0–0.3)
IMM GRANULOCYTES NFR BLD: 1 %
LYMPHOCYTES NFR BLD: 1.24 K/UL (ref 1.1–3.7)
LYMPHOCYTES RELATIVE PERCENT: 14 % (ref 24–43)
MAGNESIUM SERPL-MCNC: 1.5 MG/DL (ref 1.6–2.4)
MCH RBC QN AUTO: 28.6 PG (ref 25.2–33.5)
MCHC RBC AUTO-ENTMCNC: 30.4 G/DL (ref 28.4–34.8)
MCV RBC AUTO: 94.1 FL (ref 82.6–102.9)
MONOCYTES NFR BLD: 0.82 K/UL (ref 0.1–1.2)
MONOCYTES NFR BLD: 9 % (ref 3–12)
NEUTROPHILS NFR BLD: 70 % (ref 36–65)
NEUTS SEG NFR BLD: 6.05 K/UL (ref 1.5–8.1)
NRBC BLD-RTO: 0 PER 100 WBC
PLATELET # BLD AUTO: 231 K/UL (ref 138–453)
PMV BLD AUTO: 8.6 FL (ref 8.1–13.5)
POTASSIUM SERPL-SCNC: 2.8 MMOL/L (ref 3.7–5.3)
POTASSIUM SERPL-SCNC: 3.6 MMOL/L (ref 3.7–5.3)
RBC # BLD AUTO: 3.22 M/UL (ref 3.95–5.11)
RBC # BLD: ABNORMAL 10*6/UL
SODIUM SERPL-SCNC: 141 MMOL/L (ref 136–145)
WBC OTHER # BLD: 8.7 K/UL (ref 3.5–11.3)

## 2024-07-16 PROCEDURE — 36415 COLL VENOUS BLD VENIPUNCTURE: CPT

## 2024-07-16 PROCEDURE — 6370000000 HC RX 637 (ALT 250 FOR IP)

## 2024-07-16 PROCEDURE — 6360000002 HC RX W HCPCS

## 2024-07-16 PROCEDURE — 2580000003 HC RX 258

## 2024-07-16 PROCEDURE — 6370000000 HC RX 637 (ALT 250 FOR IP): Performed by: INTERNAL MEDICINE

## 2024-07-16 PROCEDURE — 2060000000 HC ICU INTERMEDIATE R&B

## 2024-07-16 PROCEDURE — 83735 ASSAY OF MAGNESIUM: CPT

## 2024-07-16 PROCEDURE — 82947 ASSAY GLUCOSE BLOOD QUANT: CPT

## 2024-07-16 PROCEDURE — 99232 SBSQ HOSP IP/OBS MODERATE 35: CPT | Performed by: INTERNAL MEDICINE

## 2024-07-16 PROCEDURE — 85025 COMPLETE CBC W/AUTO DIFF WBC: CPT

## 2024-07-16 PROCEDURE — 80048 BASIC METABOLIC PNL TOTAL CA: CPT

## 2024-07-16 PROCEDURE — 84132 ASSAY OF SERUM POTASSIUM: CPT

## 2024-07-16 PROCEDURE — 99222 1ST HOSP IP/OBS MODERATE 55: CPT | Performed by: INTERNAL MEDICINE

## 2024-07-16 PROCEDURE — 99233 SBSQ HOSP IP/OBS HIGH 50: CPT | Performed by: HOSPITALIST

## 2024-07-16 RX ORDER — LEVOFLOXACIN 500 MG/1
500 TABLET, FILM COATED ORAL DAILY
Status: DISCONTINUED | OUTPATIENT
Start: 2024-07-16 | End: 2024-07-17 | Stop reason: HOSPADM

## 2024-07-16 RX ORDER — POTASSIUM CHLORIDE 7.45 MG/ML
10 INJECTION INTRAVENOUS
Status: DISCONTINUED | OUTPATIENT
Start: 2024-07-16 | End: 2024-07-16

## 2024-07-16 RX ORDER — MAGNESIUM SULFATE IN WATER 40 MG/ML
2000 INJECTION, SOLUTION INTRAVENOUS ONCE
Status: COMPLETED | OUTPATIENT
Start: 2024-07-16 | End: 2024-07-16

## 2024-07-16 RX ADMIN — ACETAMINOPHEN 650 MG: 325 TABLET ORAL at 04:52

## 2024-07-16 RX ADMIN — HEPARIN SODIUM 5000 UNITS: 5000 INJECTION INTRAVENOUS; SUBCUTANEOUS at 04:53

## 2024-07-16 RX ADMIN — MEROPENEM 1000 MG: 1 INJECTION INTRAVENOUS at 09:37

## 2024-07-16 RX ADMIN — SODIUM CHLORIDE, POTASSIUM CHLORIDE, SODIUM LACTATE AND CALCIUM CHLORIDE: 600; 310; 30; 20 INJECTION, SOLUTION INTRAVENOUS at 09:30

## 2024-07-16 RX ADMIN — SODIUM CHLORIDE, PRESERVATIVE FREE 10 ML: 5 INJECTION INTRAVENOUS at 19:52

## 2024-07-16 RX ADMIN — OXYCODONE 5 MG: 5 TABLET ORAL at 04:52

## 2024-07-16 RX ADMIN — DESMOPRESSIN ACETATE 40 MG: 0.2 TABLET ORAL at 19:52

## 2024-07-16 RX ADMIN — DULOXETINE HYDROCHLORIDE 20 MG: 20 CAPSULE, DELAYED RELEASE ORAL at 19:52

## 2024-07-16 RX ADMIN — MIDODRINE HYDROCHLORIDE 5 MG: 5 TABLET ORAL at 09:30

## 2024-07-16 RX ADMIN — POTASSIUM CHLORIDE 10 MEQ: 7.46 INJECTION, SOLUTION INTRAVENOUS at 06:27

## 2024-07-16 RX ADMIN — POTASSIUM BICARBONATE 40 MEQ: 782 TABLET, EFFERVESCENT ORAL at 06:24

## 2024-07-16 RX ADMIN — LEVOFLOXACIN 500 MG: 500 TABLET, FILM COATED ORAL at 13:15

## 2024-07-16 RX ADMIN — SODIUM CHLORIDE, PRESERVATIVE FREE 10 ML: 5 INJECTION INTRAVENOUS at 09:31

## 2024-07-16 RX ADMIN — POTASSIUM CHLORIDE 10 MEQ: 7.46 INJECTION, SOLUTION INTRAVENOUS at 05:25

## 2024-07-16 RX ADMIN — Medication 5 MG: at 19:52

## 2024-07-16 RX ADMIN — HEPARIN SODIUM 5000 UNITS: 5000 INJECTION INTRAVENOUS; SUBCUTANEOUS at 13:16

## 2024-07-16 RX ADMIN — PANTOPRAZOLE SODIUM 40 MG: 40 TABLET, DELAYED RELEASE ORAL at 05:14

## 2024-07-16 RX ADMIN — HEPARIN SODIUM 5000 UNITS: 5000 INJECTION INTRAVENOUS; SUBCUTANEOUS at 20:55

## 2024-07-16 RX ADMIN — POTASSIUM CHLORIDE 10 MEQ: 7.46 INJECTION, SOLUTION INTRAVENOUS at 13:16

## 2024-07-16 RX ADMIN — POTASSIUM CHLORIDE 10 MEQ: 7.46 INJECTION, SOLUTION INTRAVENOUS at 04:13

## 2024-07-16 RX ADMIN — MAGNESIUM SULFATE HEPTAHYDRATE 2000 MG: 40 INJECTION, SOLUTION INTRAVENOUS at 06:30

## 2024-07-16 RX ADMIN — POTASSIUM CHLORIDE 10 MEQ: 7.46 INJECTION, SOLUTION INTRAVENOUS at 11:11

## 2024-07-16 RX ADMIN — MIDODRINE HYDROCHLORIDE 5 MG: 5 TABLET ORAL at 16:46

## 2024-07-16 ASSESSMENT — PAIN DESCRIPTION - LOCATION: LOCATION: BACK

## 2024-07-16 ASSESSMENT — PAIN SCALES - GENERAL: PAINLEVEL_OUTOF10: 10

## 2024-07-16 NOTE — PROGRESS NOTES
Resident paged about patient excoriation in the fanny region. It is causing the patient extreme discomfort which includes grimacing, yelling out, and sobbing. Resident will communicate with the primary team to discuss possible solutions.

## 2024-07-16 NOTE — ACP (ADVANCE CARE PLANNING)
Advance Care Planning      Palliative Medicine Provider (MD/NP)  Advance Care Planning (ACP) Conversation      Date of Conversation: 07/16/24  The patient and/or authorized decision maker consented to a voluntary Advance Care Planning conversation.   Individuals present for the conversation:   Legal healthcare agent named below    Legal Healthcare Agent(s):  Allie [place]    ACP documents available in EMR prior to discussion:  -Power of  for Healthcare    Primary Palliative Diagnosis(es):  Dementia, moderate    Conversation Summary:  Allie is primary HCPOA.  Her brother, Beni is first alternate, per her reports.  Agreeable to DNR-CCA, previously full code.  No intubation.  Hospice informational session    Resuscitation Status:    Code Status: DNR-CCA    Outcomes / Completed Documentation:  An explanation of advance directives and their importance was provided and the following forms completed:    -Portable DNR    If new document completed, original was provided to patient and/or family member.    Copy was placed for scanning into the [facility] EMR.      I spent 10 minutes providing separately identifiable ACP services with the patient and/or surrogate decision maker in a voluntary, in-person conversation discussing the patient's wishes and goals as detailed in the above note.       VERONICA ORTIZ, DO

## 2024-07-16 NOTE — CARE COORDINATION
Transitional planning note: patient is a long term care resident at [facility] and plans to return. Spoke with Jess in admissions at [facility] who confirms that patient is a long term care resident with bedhold and that patient has medicare & [place] Medicaid. CM advised that palliative care team met with family and that a hospice order has been placed. Jess states that when patient returns to their facility they can hospice come out to facility as soon as patient returns.

## 2024-07-16 NOTE — PROGRESS NOTES
Patient urine contained sediment and had a thicker consistency. Resident notified. No new orders.

## 2024-07-16 NOTE — PLAN OF CARE
baseline  Description: INTERVENTIONS:  1. Assess for possible contributors to thought disturbance, including medications, impaired vision or hearing, underlying metabolic abnormalities, dehydration, psychiatric diagnoses, and notify attending LIP  2. [place] high risk fall precautions, as indicated  3. Provide frequent short contacts to provide reality reorientation, refocusing and direction  4. Decrease environmental stimuli, including noise as appropriate  5. Monitor and intervene to maintain adequate nutrition, hydration, elimination, sleep and activity  6. If unable to ensure safety without constant attention obtain sitter and review sitter guidelines with assigned personnel  7. Initiate Psychosocial CNS and Spiritual Care consult, as indicated  7/15/2024 2217 by Brodie Chappell RN  Outcome: Progressing  7/15/2024 1245 by Lucrecia Pena RN  Outcome: Progressing     Problem: Respiratory - Adult  Goal: Achieves optimal ventilation and oxygenation  7/15/2024 2217 by Brodie Chappell RN  Outcome: Progressing  7/15/2024 1245 by Lucrecia Pena RN  Outcome: Progressing     Problem: Cardiovascular - Adult  Goal: Maintains optimal cardiac output and hemodynamic stability  7/15/2024 2217 by Brodie Chappell RN  Outcome: Progressing  7/15/2024 1245 by Lucrecia Pena RN  Outcome: Progressing  Goal: Absence of cardiac dysrhythmias or at baseline  7/15/2024 2217 by Brodie Chappell RN  Outcome: Progressing  7/15/2024 1245 by Lucrecia Pena RN  Outcome: Progressing     Problem: Skin/Tissue Integrity - Adult  Goal: Skin integrity remains intact  7/15/2024 2217 by Brodie Chappell RN  Outcome: Progressing  7/15/2024 1245 by Lucrecia Pena RN  Outcome: Progressing  Goal: Incisions, wounds, or drain sites healing without S/S of infection  7/15/2024 2217 by Brodie Chappell RN  Outcome: Progressing  7/15/2024 1245 by Lucrecia Pena RN  Outcome: Progressing  Goal: Oral  mucous membranes remain intact  7/15/2024 2217 by Brodie Chappell, RN  Outcome: Progressing  7/15/2024 1245 by Lucrecia Pena RN  Outcome: Progressing

## 2024-07-16 NOTE — PROGRESS NOTES
was taken off vasopressors and Precedex, Pyridium was stopped and  Ativan 0.25mg 3 times daily as needed was started     Currently the patient is AO x 1,hemodynamically stable and maintaining saturation at room air.  Patient was discontinued on Xanax and started on melatonin 5 mg for sleep.  Continue to follow with ID recommendations and urology recommendations.  Patient is MRSA nasal swab positive likely asymptomatic carrier as she is a SNF resident.    OBJECTIVE     Vital Signs:  /61   Pulse 85   Temp 98.2 °F (36.8 °C) (Axillary)   Resp 19   Ht 1.62 m (5' 3.78\")   Wt 78.8 kg (173 lb 11.6 oz)   SpO2 97%   BMI 30.03 kg/m²     Temp (24hrs), Av.6 °F (37 °C), Min:98.1 °F (36.7 °C), Max:99.3 °F (37.4 °C)    In: 377.4   Out: 3170 [Urine:3170]    Physical Exam:  Physical Exam  Constitutional:       Appearance: Normal appearance.   HENT:      Head: Normocephalic and atraumatic.   Eyes:      General: No scleral icterus.  Cardiovascular:      Rate and Rhythm: Normal rate and regular rhythm.      Heart sounds: Normal heart sounds.   Pulmonary:      Breath sounds: Normal breath sounds. No wheezing or rhonchi.           Medications:  Scheduled Medications:    potassium chloride  10 mEq IntraVENous Q1H    magnesium sulfate  2,000 mg IntraVENous Once    pantoprazole  40 mg Oral QAM AC    melatonin  5 mg Oral Nightly    midodrine  5 mg Oral TID WC    sodium chloride flush  5-40 mL IntraVENous 2 times per day    [Held by provider] carvedilol  25 mg Oral BID WC    atorvastatin  40 mg Oral Nightly    DULoxetine  20 mg Oral Nightly    [Held by provider] isosorbide mononitrate  30 mg Oral Daily    heparin (porcine)  5,000 Units SubCUTAneous 3 times per day    meropenem  1,000 mg IntraVENous Q12H     Continuous Infusions:    lactated ringers IV soln 75 mL/hr at 07/15/24 1826    sodium chloride Stopped (24)     PRN MedicationsoxyCODONE, 5 mg, Q6H PRN  sodium chloride flush, 5-40 mL, PRN  sodium chloride, ,  metabolic encephalopathy  Active Problems:    Septic shock (HCC)    Urinary tract infection without hematuria    Delirium  Resolved Problems:    * No resolved hospital problems. *      Acute metabolic encephalopathy possibly secondary to urosepsis:       - Patient is still at baseline AO x 1.  -On admission patient was confused and somnolent.     Severe sepsis possibly secondary to UTI following left ureteral stent exchange     -Blood cultures negative so far.  Urine culture does not show any significant growth.   -  Blood pressure in ICU was 104-131 SBP, 47-90 DBP.  Blood pressure on 7/16/24 is 121/59.  -Continue meropenem 1000 mg.  -Blood cultures and urine cultures does not show any growth so far.   -Follow-up on ID and urology recommendations.     Urethral stricture  Hydronephrosis     -Prior episode of hydronephrosis and stent placements in 2021  -Stent exchanges every 4 once, Last exchange 4-26-24 after UTI with Klebsiella  -No acute urologic intervention indicated at this time.   -Follow-up on ID and urology recommendations.     CKD stage IIIb     -eGFR on admission was 32..  eGFR on 7/16/24 was 47.  -Avoid nephrotoxic drugs.  -Continue intravenous fluids.  -Continue to monitor.     Hypokalemia     -Serum potassium on 7/16/24 was 2.8, replaced.  -Continue to monitor.     Hyponatremia: Improving     -Sodium on admission was 135, on 7/16/24, sodium is 141.      Diet: Adult diet, dysphagia  DVT ppx : Heparin  GI ppx: Protonix    PT/OT: On board  Discharge Planning / SW: Will discharge once medically stable      Chaitanya Chavis MD  Internal Medicine Resident, PGY-1  [place], [place]  7/16/2024 6:46 AM

## 2024-07-16 NOTE — PLAN OF CARE
Problem: Discharge Planning  Goal: Discharge to home or other facility with appropriate resources  7/16/2024 1024 by Martha Wolf RN  Outcome: Progressing  7/15/2024 2217 by Brodie Chappell RN  Outcome: Progressing     Problem: Pain  Goal: Verbalizes/displays adequate comfort level or baseline comfort level  7/16/2024 1024 by Martha Wolf RN  Outcome: Progressing  7/15/2024 2217 by Brodie Chappell RN  Outcome: Progressing  Flowsheets (Taken 7/15/2024 1600 by Lucrecia Pena RN)  Verbalizes/displays adequate comfort level or baseline comfort level: Assess pain using appropriate pain scale

## 2024-07-16 NOTE — CONSULTS
Palliative Care Inpatient Consult    NAME:  Suzy Flaherty  MEDICAL RECORD NUMBER:  [id number]  AGE: 78 y.o.   GENDER: female  : 1945  TODAY'S DATE:  2024    Reasons for Consultation:    Symptom and/or pain management  Provision of information regarding PC and/or hospice philosophies  Complex, time-intensive communication and interdisciplinary psychosocial support  Clarification of goals of care and/or assistance with difficult decision-making  Guidance in regards to resources and transition(s)    Members of PC team contributing to this consultation are: Chris Alonzo,     Plan      Palliative Interaction:    The patient was seen and examined this morning at bedside.  She is currently alert and oriented to self only.  She is unable to tell me why she is in the hospital.  She is able to maintain somewhat of a normal conversation.  She tells me that she is very weak and fatigued.  She tells me that she wishes that she could eat breakfast.  However, it is cold and she really is not hungry.    No family at bedside.    I was able to reach out to the patient's daughter, Allie, this morning. She has been in and out of the hospital since April due to recurrent UTIs. She notes that she has had stents placed and exchanged. She's had constant UTIs for months.     She lives in a nursing facility in [place]. She has resided there for 3 years. She is incontinent of urine, but not stool usually. She cannot walk. She has had a decline in her appetite. She notes that the patient declines    Suzy was receiving palliative services at [facility].     The patient has a daughter and two sons. Ade has POA over financials. Allie and Beni are listed as #1 and #2 on HCPOA.     The patient's  was also at [facility] due to dementia. He was on hospice care at their facility.     Allie notes that she has spoken to her brothers regarding CPR and resuscitative measures. She notes that prior to declining from a mentation  also evaluated. Her shock improved and she was transferred out of the ICU.     Palliative care has been consulted to discuss goals of care with the patient's family, as patient is oriented x 1.    Referred to Palliative Care by   [x] Physician   [] Nursing  [] Family Request   [] Other:       Active Hospital Problems    Diagnosis Date Noted    Delirium [R41.0] 07/15/2024    Acute metabolic encephalopathy [G93.40] 07/14/2024    Urinary tract infection without hematuria [N39.0] 07/13/2024    Septic shock (HCC) [A41.9, R65.21] 07/12/2024       PAST MEDICAL HISTORY  No past medical history on file.    PAST SURGICAL HISTORY  No past surgical history on file.    SOCIAL HISTORY       FAMILY HISTORY  No family history on file.    ALLERGIES  Allergies   Allergen Reactions    Fentanyl Hallucinations     Pt sensitive to medication - hallucinates       MEDICATIONS  Current Medications    pantoprazole  40 mg Oral QAM AC    melatonin  5 mg Oral Nightly    midodrine  5 mg Oral TID WC    sodium chloride flush  5-40 mL IntraVENous 2 times per day    [Held by provider] carvedilol  25 mg Oral BID WC    atorvastatin  40 mg Oral Nightly    DULoxetine  20 mg Oral Nightly    [Held by provider] isosorbide mononitrate  30 mg Oral Daily    heparin (porcine)  5,000 Units SubCUTAneous 3 times per day     oxyCODONE, sodium chloride flush, sodium chloride, potassium chloride **OR** potassium chloride, magnesium sulfate, ondansetron **OR** ondansetron, polyethylene glycol, acetaminophen **OR** acetaminophen, hyoscyamine, ipratropium 0.5 mg-albuterol 2.5 mg  Home Medications  No current facility-administered medications on file prior to encounter.     No current outpatient medications on file prior to encounter.       Data         BP (!) 121/59   Pulse 80   Temp 97.9 °F (36.6 °C) (Temporal)   Resp 18   Ht 1.62 m (5' 3.78\")   Wt 78.8 kg (173 lb 11.6 oz)   SpO2 96%   BMI 30.03 kg/m²     Wt Readings from Last 3 Encounters:   07/12/24 78.8 kg

## 2024-07-16 NOTE — PROGRESS NOTES
15* 19*   BUN 21 15   CREATININE 1.3* 1.2*   MG 1.7 1.5*       Hepatic Function Panel:   No results for input(s): \"LABALBU\", \"BILIDIR\", \"IBILI\", \"BILITOT\", \"ALKPHOS\", \"ALT\", \"AST\" in the last 72 hours.    Invalid input(s): \"PROT\"    No results for input(s): \"RPR\" in the last 72 hours.  No results for input(s): \"HIV\" in the last 72 hours.  No results for input(s): \"BC\" in the last 72 hours.  Lab Results   Component Value Date/Time    RBC 3.22 07/16/2024 03:04 AM    WBC 8.7 07/16/2024 03:04 AM     Lab Results   Component Value Date/Time    CREATININE 1.2 07/16/2024 03:04 AM    GLUCOSE 94 07/16/2024 03:04 AM       Medical Decision Making-Imaging:   No results found.    Medical Decision Duzpvm-Hvmvvaka-Hkinp:     Results       Procedure Component Value Units Date/Time    Culture, Urine [[id number]] Collected: 07/12/24 2153    Order Status: Completed Specimen: Urine, clean catch Updated: 07/13/24 2126     Specimen Description .CLEAN CATCH URINE     Special Requests Site: Urine     Culture NO SIGNIFICANT GROWTH    Culture, Blood 1 [[id number]] Collected: 07/12/24 1725    Order Status: Completed Specimen: Blood Updated: 07/15/24 1847     Specimen Description .BLOOD     Special Requests          Culture NO GROWTH 3 DAYS    Culture, Blood 2 [[id number]] Collected: 07/12/24 1725    Order Status: Completed Specimen: Blood Updated: 07/15/24 1846     Specimen Description .BLOOD     Special Requests Site: Blood     Culture NO GROWTH 3 DAYS    MRSA DNA Probe, Nasal [[id number]]  (Abnormal) Collected: 07/12/24 1654    Order Status: Completed Specimen: Nasal Updated: 07/13/24 1006     Specimen Description .NASAL SWAB     MRSA, DNA, Nasal POSITIVE:  MRSA DNA detected by nucleic acid amplification.     Comment:                                                   Results should be used as an adjunct to nosocomial control efforts to identify patients   needing enhanced precautions.    The test is not intended to identify patients with  staphylococcal infections.  Results   should not be used to guide or monitor treatment for MRSA infections.         MRSA DNA Probe, Nasal [[id number]]     Order Status: Canceled Specimen: Nares               Medical Decision Making-Other:     Note:    Thank you for allowing us to participate in the care of this patient. Please call with questions.    MD Marlon Keith MD Heather Helweg, APRN    ATTESTATION:    I have discussed the case, including pertinent history and exam findings with the APRN. I have evaluated the  History, physical findings and pictures of the patient and the key elements of the encounter have been performed by me. I have reviewed the laboratory data, other diagnostic studies and discussed them with the APRN. I have updated the medical record where necessary.    I agree with the assessment, plan and orders as documented by the APRN.    In addition diagnostic and decision making elements, performed by the Attending Physician, are included in the Diagnostic and Decision Making Section of the text.    Marlon Acuña MD     7/16/2024          Office: [phone]

## 2024-07-17 VITALS
WEIGHT: 173.72 LBS | HEIGHT: 64 IN | TEMPERATURE: 98.1 F | RESPIRATION RATE: 18 BRPM | SYSTOLIC BLOOD PRESSURE: 134 MMHG | HEART RATE: 97 BPM | OXYGEN SATURATION: 95 % | DIASTOLIC BLOOD PRESSURE: 94 MMHG | BODY MASS INDEX: 29.66 KG/M2

## 2024-07-17 LAB
ANION GAP SERPL CALCULATED.3IONS-SCNC: 8 MMOL/L (ref 9–16)
BASOPHILS # BLD: 0.03 K/UL (ref 0–0.2)
BASOPHILS NFR BLD: 0 % (ref 0–2)
BUN SERPL-MCNC: 14 MG/DL (ref 8–23)
CALCIUM SERPL-MCNC: 7.7 MG/DL (ref 8.6–10.4)
CHLORIDE SERPL-SCNC: 107 MMOL/L (ref 98–107)
CO2 SERPL-SCNC: 22 MMOL/L (ref 20–31)
CREAT SERPL-MCNC: 1.1 MG/DL (ref 0.5–0.9)
EOSINOPHIL # BLD: 0.47 K/UL (ref 0–0.44)
EOSINOPHILS RELATIVE PERCENT: 6 % (ref 1–4)
ERYTHROCYTE [DISTWIDTH] IN BLOOD BY AUTOMATED COUNT: 16.5 % (ref 11.8–14.4)
GFR, ESTIMATED: 51 ML/MIN/1.73M2
GLUCOSE SERPL-MCNC: 104 MG/DL (ref 74–99)
HCT VFR BLD AUTO: 29.8 % (ref 36.3–47.1)
HGB BLD-MCNC: 9.2 G/DL (ref 11.9–15.1)
IMM GRANULOCYTES # BLD AUTO: 0.15 K/UL (ref 0–0.3)
IMM GRANULOCYTES NFR BLD: 2 %
LYMPHOCYTES NFR BLD: 1.14 K/UL (ref 1.1–3.7)
LYMPHOCYTES RELATIVE PERCENT: 14 % (ref 24–43)
MAGNESIUM SERPL-MCNC: 1.9 MG/DL (ref 1.6–2.4)
MCH RBC QN AUTO: 29 PG (ref 25.2–33.5)
MCHC RBC AUTO-ENTMCNC: 30.9 G/DL (ref 28.4–34.8)
MCV RBC AUTO: 94 FL (ref 82.6–102.9)
MICROORGANISM SPEC CULT: NORMAL
MICROORGANISM SPEC CULT: NORMAL
MONOCYTES NFR BLD: 0.78 K/UL (ref 0.1–1.2)
MONOCYTES NFR BLD: 10 % (ref 3–12)
NEUTROPHILS NFR BLD: 68 % (ref 36–65)
NEUTS SEG NFR BLD: 5.55 K/UL (ref 1.5–8.1)
NRBC BLD-RTO: 0 PER 100 WBC
PLATELET # BLD AUTO: 237 K/UL (ref 138–453)
PMV BLD AUTO: 8.8 FL (ref 8.1–13.5)
POTASSIUM SERPL-SCNC: 3.3 MMOL/L (ref 3.7–5.3)
RBC # BLD AUTO: 3.17 M/UL (ref 3.95–5.11)
RBC # BLD: ABNORMAL 10*6/UL
SERVICE CMNT-IMP: NORMAL
SERVICE CMNT-IMP: NORMAL
SODIUM SERPL-SCNC: 137 MMOL/L (ref 136–145)
SPECIMEN DESCRIPTION: NORMAL
SPECIMEN DESCRIPTION: NORMAL
WBC OTHER # BLD: 8.1 K/UL (ref 3.5–11.3)

## 2024-07-17 PROCEDURE — 83735 ASSAY OF MAGNESIUM: CPT

## 2024-07-17 PROCEDURE — 6370000000 HC RX 637 (ALT 250 FOR IP)

## 2024-07-17 PROCEDURE — 6370000000 HC RX 637 (ALT 250 FOR IP): Performed by: INTERNAL MEDICINE

## 2024-07-17 PROCEDURE — 36415 COLL VENOUS BLD VENIPUNCTURE: CPT

## 2024-07-17 PROCEDURE — 6360000002 HC RX W HCPCS

## 2024-07-17 PROCEDURE — 85025 COMPLETE CBC W/AUTO DIFF WBC: CPT

## 2024-07-17 PROCEDURE — 80048 BASIC METABOLIC PNL TOTAL CA: CPT

## 2024-07-17 PROCEDURE — 2580000003 HC RX 258

## 2024-07-17 PROCEDURE — 99239 HOSP IP/OBS DSCHRG MGMT >30: CPT | Performed by: HOSPITALIST

## 2024-07-17 RX ORDER — LIDOCAINE PAIN RELIEF 40 MG/1000MG
1 PATCH TOPICAL DAILY
COMMUNITY
Start: 2024-07-11

## 2024-07-17 RX ORDER — MIRABEGRON 50 MG/1
50 TABLET, EXTENDED RELEASE ORAL DAILY
COMMUNITY

## 2024-07-17 RX ORDER — ISOSORBIDE MONONITRATE 30 MG/1
30 TABLET, EXTENDED RELEASE ORAL DAILY
COMMUNITY
Start: 2024-02-02

## 2024-07-17 RX ORDER — METHENAMINE HIPPURATE 1000 MG/1
1 TABLET ORAL 2 TIMES DAILY WITH MEALS
COMMUNITY
Start: 2024-07-03

## 2024-07-17 RX ORDER — MIDODRINE HYDROCHLORIDE 5 MG/1
5 TABLET ORAL
Qty: 180 TABLET | Refills: 0 | Status: SHIPPED | OUTPATIENT
Start: 2024-07-17 | End: 2024-09-15

## 2024-07-17 RX ORDER — LEVOFLOXACIN 500 MG/1
500 TABLET, FILM COATED ORAL DAILY
Qty: 4 TABLET | Refills: 0 | Status: SHIPPED | OUTPATIENT
Start: 2024-07-18 | End: 2024-07-22

## 2024-07-17 RX ORDER — ATORVASTATIN CALCIUM 20 MG/1
20 TABLET, FILM COATED ORAL DAILY
Status: ON HOLD | COMMUNITY
Start: 2024-06-25 | End: 2024-07-17 | Stop reason: HOSPADM

## 2024-07-17 RX ORDER — TRAMADOL HYDROCHLORIDE 50 MG/1
50 TABLET ORAL EVERY 6 HOURS PRN
COMMUNITY
Start: 2024-07-01

## 2024-07-17 RX ORDER — FUROSEMIDE 20 MG/1
1 TABLET ORAL DAILY
COMMUNITY
Start: 2021-01-18

## 2024-07-17 RX ORDER — GABAPENTIN 100 MG/1
200 CAPSULE ORAL 2 TIMES DAILY
COMMUNITY

## 2024-07-17 RX ORDER — ATORVASTATIN CALCIUM 40 MG/1
40 TABLET, FILM COATED ORAL NIGHTLY
Qty: 60 TABLET | Refills: 0 | Status: SHIPPED | OUTPATIENT
Start: 2024-07-17 | End: 2024-09-15

## 2024-07-17 RX ORDER — DULOXETIN HYDROCHLORIDE 20 MG/1
20 CAPSULE, DELAYED RELEASE ORAL DAILY
COMMUNITY

## 2024-07-17 RX ORDER — CARVEDILOL 25 MG/1
25 TABLET ORAL 2 TIMES DAILY WITH MEALS
Status: ON HOLD | COMMUNITY
End: 2024-07-17 | Stop reason: HOSPADM

## 2024-07-17 RX ADMIN — HEPARIN SODIUM 5000 UNITS: 5000 INJECTION INTRAVENOUS; SUBCUTANEOUS at 06:12

## 2024-07-17 RX ADMIN — OXYCODONE 5 MG: 5 TABLET ORAL at 11:15

## 2024-07-17 RX ADMIN — POTASSIUM CHLORIDE 20 MEQ: 29.8 INJECTION, SOLUTION INTRAVENOUS at 05:12

## 2024-07-17 RX ADMIN — POTASSIUM BICARBONATE 40 MEQ: 782 TABLET, EFFERVESCENT ORAL at 08:20

## 2024-07-17 RX ADMIN — LEVOFLOXACIN 500 MG: 500 TABLET, FILM COATED ORAL at 08:16

## 2024-07-17 RX ADMIN — MIDODRINE HYDROCHLORIDE 5 MG: 5 TABLET ORAL at 08:16

## 2024-07-17 RX ADMIN — PANTOPRAZOLE SODIUM 40 MG: 40 TABLET, DELAYED RELEASE ORAL at 06:12

## 2024-07-17 RX ADMIN — SODIUM CHLORIDE, PRESERVATIVE FREE 10 ML: 5 INJECTION INTRAVENOUS at 08:20

## 2024-07-17 RX ADMIN — POTASSIUM CHLORIDE 20 MEQ: 29.8 INJECTION, SOLUTION INTRAVENOUS at 06:16

## 2024-07-17 NOTE — PLAN OF CARE
Urology plan of care:  Renal US ordered for outpatient in ~2 weeks to re-evaluate hydronephrosis. Maintain foster catheter until office follow up. Exchange foster every 3-4 weeks if unable to see Dr Gary before then.  Patient to see Dr. Gary in [place] office within 3 weeks for addressing foster catheter and future stent exchanges.     Tiffany Paige PA-C  Urology Service   7/17/2024 3:37 PM

## 2024-07-17 NOTE — DISCHARGE INSTRUCTIONS
You were admitted to the hospital with altered mental status and found to have urinary tract infection  You were treated with antibiotics.you required medications to maintain your blood pressure.  Start taking levofloxacin 500 mg for 4 days. Stop date 7/20/2024  Start taking midodrine 5 mg three times a day.  Hold on taking coreg 25 mg two times a day  Monitor BP and resume as needed.  Continue taking your old medications as prescribed  In case of worsening symptoms or new symptoms arise, please visit ER.        Urology plan of care:  Renal US ordered for outpatient in ~2 weeks to re-evaluate hydronephrosis. Maintain foster catheter until office follow up. Exchange foster every 3-4 weeks if unable to see Dr Gary before then.  Patient to see Dr. Gary in [place] office within 3 weeks for addressing foster catheter and future stent exchanges.

## 2024-07-17 NOTE — PLAN OF CARE
Problem: Discharge Planning  Goal: Discharge to home or other facility with appropriate resources  Outcome: Progressing     Problem: Pain  Goal: Verbalizes/displays adequate comfort level or baseline comfort level  Outcome: Progressing     Problem: Safety - Adult  Goal: Free from fall injury  Outcome: Progressing     Problem: Skin/Tissue Integrity  Goal: Absence of new skin breakdown  Description: 1.  Monitor for areas of redness and/or skin breakdown  2.  Assess vascular access sites hourly  3.  Every 4-6 hours minimum:  Change oxygen saturation probe site  4.  Every 4-6 hours:  If on nasal continuous positive airway pressure, respiratory therapy assess nares and determine need for appliance change or resting period.  Outcome: Progressing     Problem: ABCDS Injury Assessment  Goal: Absence of physical injury  Outcome: Progressing     Problem: Confusion  Goal: Confusion, delirium, dementia, or psychosis is improved or at baseline  Description: INTERVENTIONS:  1. Assess for possible contributors to thought disturbance, including medications, impaired vision or hearing, underlying metabolic abnormalities, dehydration, psychiatric diagnoses, and notify attending LIP  2. [place] high risk fall precautions, as indicated  3. Provide frequent short contacts to provide reality reorientation, refocusing and direction  4. Decrease environmental stimuli, including noise as appropriate  5. Monitor and intervene to maintain adequate nutrition, hydration, elimination, sleep and activity  6. If unable to ensure safety without constant attention obtain sitter and review sitter guidelines with assigned personnel  7. Initiate Psychosocial CNS and Spiritual Care consult, as indicated  Outcome: Progressing     Problem: Respiratory - Adult  Goal: Achieves optimal ventilation and oxygenation  Outcome: Progressing     Problem: Cardiovascular - Adult  Goal: Maintains optimal cardiac output and hemodynamic stability  Outcome:

## 2024-07-17 NOTE — PROGRESS NOTES
Physician Progress Note      PATIENT:               LYSSA SANCHEZ  CSN #:                  [id number]  :                       1945  ADMIT DATE:       2024 4:04 PM  DISCH DATE:  RESPONDING  PROVIDER #:        Jaison Dinh MD          QUERY TEXT:    Pt admitted with UTI.  Pt noted to have ureteral stent with recent change. If   possible, please document in the progress notes and discharge summary if you   are evaluating and/or treating any of the following:    The medical record reflects the following:  Risk Factors: CKD4, recurring UTIs  Clinical Indicators: Patient admitted with septic shock.  Patient with history   of ureteral stricture and stent exchange on 2024 .Stent exchanges every   4 months with frequent UTIs. Recent UTI 2024.Per Urology notes-' Sepsis   secondary to UTI following left ureteral stent exchange. Septic shock   secondary to likely UTI / pyelonephritis following recent left ureteral stent   exchange on .'  Treatment: Urology/ID consults, foster cath placement, IV Abx  Options provided:  -- UTI due to ureteral stent  -- UTI not due to ureteral stent  -- Other - I will add my own diagnosis  -- Disagree - Not applicable / Not valid  -- Disagree - Clinically unable to determine / Unknown  -- Refer to Clinical Documentation Reviewer    PROVIDER RESPONSE TEXT:    UTI is due to ureteral stent.    Query created by: Lisa Ashton on 2024 8:46 AM      Electronically signed by:  Jaison Dinh MD 2024 11:54 PM

## 2024-07-17 NOTE — CARE COORDINATION
Discharge Report    [facility]  Clinical Case Management Department  Written by: Vane Santana RN    Patient Name: Suzy Flaherty  Attending Provider: Jaison Quintana*  Admit Date: 2024  4:04 PM  MRN: [id number]  Account: [id number]                     : 1945  Discharge Date: 24      Disposition: long term care facility    Plan is for patient to return to [facility] with hospice services. Patient is a long term care resident at [facility]. Confirmed with Kisha in admissions at [facility] that patient is able to return. Per Kisha, they will have hospice at facility for patient when she returns. Stretcher transport confirmed for 330PM  with Raquel from [facility]. Spoke with patient's daughter Allie to update on above and she is agreeable to plan.     1458: faxed analilia to facility    Vane Santana RN

## 2024-07-17 NOTE — PROGRESS NOTES
[facility]  Internal Medicine Teaching Residency Program  Inpatient Daily Progress Note  ______________________________________________________________________________    Patient: Suzy Flaherty  YOB: 1945   MRN:[id number]    Acct: [id number]     Room: 0417/0417-02  Admit date: 7/12/2024  Today's date: 07/17/24  Number of days in the hospital: 5    SUBJECTIVE   Admitting Diagnosis: Acute encephalopathy    CC: Altered mental status possibly secondary to UTI.    Pt examined at bedside. Chart & results reviewed.   Patient is AO x 1, which is her baseline.  No acute overnight distress.   Patient has no acute complaints.  Patient on IV meropenem 100 mg.  Patient is hemodynamically stable and maintaining saturation at room air.        BRIEF HISTORY     The patient is a 78 y.o. female with past medical history significant for prior ureteral stricture status post stent exchange on 7/11/2024 on, CKD stage IV who was initially admitted on 7/12/2024 with Sepsis (HCC) [A41.9] and presented with altered mental status..     In the ER she was found to have hyperkalemia of 6.5 with EKG changes, new onset hydronephrosis,.  UA showed evidence of elevated leukocyte esterase.  Initially patient's BP was controlled with 2 L of LR last but then she became hypotensive with recorded systolic blood pressure of 70s.  She was started and progressed to Levophed at 8 mcg/min.  Patient baseline mentation is AO x 2 but she was found to be somnolent and confused, therefore she was transferred to ICU because of AMS and hypotensive episodes requiring vasopressors.     In the ICU, ID and urology was consulted on 7/13/24.  Obtain patient was started on meropenem 1 g every 12 as per ID recommendations, Angelo catheter was placed as per urology recommendations, urine cultures were ordered, Levsin was added for bladder spasms, Pyridium was added for severe dysuria.  On 7/14/24, 4 patient was

## 2024-07-19 LAB — INTERVENTION: NORMAL

## 2024-07-24 NOTE — DISCHARGE SUMMARY
Report called to Zo Bell at [facility]. No further questions or concerns at this time.   
started on Xanax, but discontinued because of development of delirium.    On 7/15/24 patient was transferred from ICU to the medicine floor.  CT scan of abdomen and pelvis revealed moderate right-sided hydroureteronephrosis similar to prior examination with mild left-sided hydronephrosis similar to prior examination.    On 7/16/24 patient was discontinued on meropenem and started on p.o. Levaquin 500 milligrams daily.    On 7/17/24 patient is medically stable and being discharged the following instructions.    Procedures/ Significant Interventions:    Vasopressors  Antibiotics.  Pyridium  CT scan of abdomen and pelvis without contrast    Consults:     Consults:     Final Specialist Recommendations/Findings:   IP CONSULT TO VASCULAR ACCESS TEAM  IP CONSULT TO UROLOGY  IP CONSULT TO INFECTIOUS DISEASES  IP CONSULT TO PALLIATIVE CARE  IP CONSULT TO HOSPICE      Any Hospital Acquired Infections: none    Discharge Functional Status:  stable    DISCHARGE PLAN     Disposition: SNF    Patient Instructions:   Discharge Medication List as of 7/17/2024  2:56 PM        START taking these medications    Details   levoFLOXacin (LEVAQUIN) 500 MG tablet Take 1 tablet by mouth daily for 4 doses, Disp-4 tablet, R-0Normal      midodrine (PROAMATINE) 5 MG tablet Take 1 tablet by mouth 3 times daily (with meals), Disp-180 tablet, R-0Normal           CONTINUE these medications which have CHANGED    Details   atorvastatin (LIPITOR) 40 MG tablet Take 1 tablet by mouth nightly, Disp-60 tablet, R-0Normal           CONTINUE these medications which have NOT CHANGED    Details   DULoxetine (CYMBALTA) 20 MG extended release capsule Take 1 capsule by mouth dailyHistorical Med      furosemide (LASIX) 20 MG tablet Take 1 tablet by mouth dailyHistorical Med      gabapentin (NEURONTIN) 100 MG capsule Take 2 capsules by mouth 2 times daily.Historical Med      isosorbide mononitrate (IMDUR) 30 MG extended release tablet Take 1 tablet by mouth